# Patient Record
Sex: MALE | Race: WHITE | NOT HISPANIC OR LATINO | Employment: UNEMPLOYED | URBAN - METROPOLITAN AREA
[De-identification: names, ages, dates, MRNs, and addresses within clinical notes are randomized per-mention and may not be internally consistent; named-entity substitution may affect disease eponyms.]

---

## 2021-05-28 ENCOUNTER — RECORDS - HEALTHEAST (OUTPATIENT)
Dept: ADMINISTRATIVE | Facility: CLINIC | Age: 56
End: 2021-05-28

## 2021-08-12 ENCOUNTER — HOSPITAL ENCOUNTER (INPATIENT)
Facility: CLINIC | Age: 56
LOS: 14 days | Discharge: PSYCHIATRIC OR SUBSTANCE USE FACILITY | End: 2021-08-27
Attending: EMERGENCY MEDICINE | Admitting: PSYCHIATRY & NEUROLOGY
Payer: COMMERCIAL

## 2021-08-12 DIAGNOSIS — I25.10 CVD (CARDIOVASCULAR DISEASE): Primary | ICD-10-CM

## 2021-08-12 DIAGNOSIS — F29 PSYCHOSIS, UNSPECIFIED PSYCHOSIS TYPE (H): ICD-10-CM

## 2021-08-12 DIAGNOSIS — Z11.52 ENCOUNTER FOR SCREENING LABORATORY TESTING FOR SEVERE ACUTE RESPIRATORY SYNDROME CORONAVIRUS 2 (SARS-COV-2): ICD-10-CM

## 2021-08-12 DIAGNOSIS — R52 PAIN: ICD-10-CM

## 2021-08-12 DIAGNOSIS — F25.9 SCHIZOAFFECTIVE DISORDER, CHRONIC CONDITION WITH ACUTE EXACERBATION (H): ICD-10-CM

## 2021-08-12 DIAGNOSIS — F22 PARANOID STATE, SIMPLE (H): ICD-10-CM

## 2021-08-12 DIAGNOSIS — F33.3 SEVERE RECURRENT MAJOR DEPRESSION WITH PSYCHOTIC FEATURES (H): ICD-10-CM

## 2021-08-12 DIAGNOSIS — F32.A DEPRESSION, UNSPECIFIED DEPRESSION TYPE: ICD-10-CM

## 2021-08-12 PROCEDURE — 99285 EMERGENCY DEPT VISIT HI MDM: CPT | Mod: 25 | Performed by: EMERGENCY MEDICINE

## 2021-08-12 PROCEDURE — C9803 HOPD COVID-19 SPEC COLLECT: HCPCS | Performed by: EMERGENCY MEDICINE

## 2021-08-12 PROCEDURE — 99284 EMERGENCY DEPT VISIT MOD MDM: CPT | Performed by: EMERGENCY MEDICINE

## 2021-08-12 PROCEDURE — 250N000013 HC RX MED GY IP 250 OP 250 PS 637: Performed by: EMERGENCY MEDICINE

## 2021-08-12 RX ORDER — IBUPROFEN 600 MG/1
600 TABLET, FILM COATED ORAL ONCE
Status: COMPLETED | OUTPATIENT
Start: 2021-08-12 | End: 2021-08-12

## 2021-08-12 RX ADMIN — IBUPROFEN 600 MG: 600 TABLET, FILM COATED ORAL at 22:56

## 2021-08-13 ENCOUNTER — TELEPHONE (OUTPATIENT)
Dept: BEHAVIORAL HEALTH | Facility: CLINIC | Age: 56
End: 2021-08-13

## 2021-08-13 PROBLEM — F29 PSYCHOSIS, UNSPECIFIED PSYCHOSIS TYPE (H): Status: ACTIVE | Noted: 2021-08-13

## 2021-08-13 PROBLEM — F32.A DEPRESSION, UNSPECIFIED DEPRESSION TYPE: Status: ACTIVE | Noted: 2021-08-13

## 2021-08-13 LAB
ALBUMIN SERPL-MCNC: 3.4 G/DL (ref 3.4–5)
ALP SERPL-CCNC: 101 U/L (ref 40–150)
ALT SERPL W P-5'-P-CCNC: 37 U/L (ref 0–70)
AMPHETAMINES UR QL SCN: ABNORMAL
ANION GAP SERPL CALCULATED.3IONS-SCNC: 5 MMOL/L (ref 3–14)
AST SERPL W P-5'-P-CCNC: 27 U/L (ref 0–45)
BARBITURATES UR QL: ABNORMAL
BASOPHILS # BLD AUTO: 0.1 10E3/UL (ref 0–0.2)
BASOPHILS NFR BLD AUTO: 1 %
BENZODIAZ UR QL: ABNORMAL
BILIRUB SERPL-MCNC: 0.7 MG/DL (ref 0.2–1.3)
BUN SERPL-MCNC: 29 MG/DL (ref 7–30)
CALCIUM SERPL-MCNC: 9 MG/DL (ref 8.5–10.1)
CANNABINOIDS UR QL SCN: ABNORMAL
CHLORIDE BLD-SCNC: 102 MMOL/L (ref 94–109)
CO2 SERPL-SCNC: 29 MMOL/L (ref 20–32)
COCAINE UR QL: ABNORMAL
CREAT SERPL-MCNC: 1.14 MG/DL (ref 0.66–1.25)
EOSINOPHIL # BLD AUTO: 0.3 10E3/UL (ref 0–0.7)
EOSINOPHIL NFR BLD AUTO: 4 %
ERYTHROCYTE [DISTWIDTH] IN BLOOD BY AUTOMATED COUNT: 12.8 % (ref 10–15)
GFR SERPL CREATININE-BSD FRML MDRD: 70 ML/MIN/1.73M2
GLUCOSE BLD-MCNC: 191 MG/DL (ref 70–99)
GLUCOSE BLDC GLUCOMTR-MCNC: 182 MG/DL (ref 70–99)
HCT VFR BLD AUTO: 39.8 % (ref 40–53)
HGB BLD-MCNC: 13.4 G/DL (ref 13.3–17.7)
IMM GRANULOCYTES # BLD: 0 10E3/UL
IMM GRANULOCYTES NFR BLD: 0 %
LYMPHOCYTES # BLD AUTO: 1.9 10E3/UL (ref 0.8–5.3)
LYMPHOCYTES NFR BLD AUTO: 27 %
MCH RBC QN AUTO: 28.7 PG (ref 26.5–33)
MCHC RBC AUTO-ENTMCNC: 33.7 G/DL (ref 31.5–36.5)
MCV RBC AUTO: 85 FL (ref 78–100)
MONOCYTES # BLD AUTO: 0.7 10E3/UL (ref 0–1.3)
MONOCYTES NFR BLD AUTO: 10 %
NEUTROPHILS # BLD AUTO: 4 10E3/UL (ref 1.6–8.3)
NEUTROPHILS NFR BLD AUTO: 58 %
NRBC # BLD AUTO: 0 10E3/UL
NRBC BLD AUTO-RTO: 0 /100
OPIATES UR QL SCN: ABNORMAL
PLATELET # BLD AUTO: 118 10E3/UL (ref 150–450)
POTASSIUM BLD-SCNC: 4.3 MMOL/L (ref 3.4–5.3)
PROT SERPL-MCNC: 7.4 G/DL (ref 6.8–8.8)
RBC # BLD AUTO: 4.67 10E6/UL (ref 4.4–5.9)
SARS-COV-2 RNA RESP QL NAA+PROBE: NEGATIVE
SODIUM SERPL-SCNC: 136 MMOL/L (ref 133–144)
WBC # BLD AUTO: 7.1 10E3/UL (ref 4–11)

## 2021-08-13 PROCEDURE — 82040 ASSAY OF SERUM ALBUMIN: CPT | Performed by: EMERGENCY MEDICINE

## 2021-08-13 PROCEDURE — U0003 INFECTIOUS AGENT DETECTION BY NUCLEIC ACID (DNA OR RNA); SEVERE ACUTE RESPIRATORY SYNDROME CORONAVIRUS 2 (SARS-COV-2) (CORONAVIRUS DISEASE [COVID-19]), AMPLIFIED PROBE TECHNIQUE, MAKING USE OF HIGH THROUGHPUT TECHNOLOGIES AS DESCRIBED BY CMS-2020-01-R: HCPCS | Performed by: EMERGENCY MEDICINE

## 2021-08-13 PROCEDURE — 80307 DRUG TEST PRSMV CHEM ANLYZR: CPT | Performed by: EMERGENCY MEDICINE

## 2021-08-13 PROCEDURE — 85025 COMPLETE CBC W/AUTO DIFF WBC: CPT | Performed by: EMERGENCY MEDICINE

## 2021-08-13 PROCEDURE — 124N000003 HC R&B MH SENIOR/ADOLESCENT

## 2021-08-13 PROCEDURE — 99222 1ST HOSP IP/OBS MODERATE 55: CPT | Performed by: PHYSICIAN ASSISTANT

## 2021-08-13 PROCEDURE — 36415 COLL VENOUS BLD VENIPUNCTURE: CPT | Performed by: EMERGENCY MEDICINE

## 2021-08-13 PROCEDURE — 250N000013 HC RX MED GY IP 250 OP 250 PS 637: Performed by: EMERGENCY MEDICINE

## 2021-08-13 PROCEDURE — 90791 PSYCH DIAGNOSTIC EVALUATION: CPT

## 2021-08-13 PROCEDURE — 250N000013 HC RX MED GY IP 250 OP 250 PS 637: Performed by: NURSE PRACTITIONER

## 2021-08-13 PROCEDURE — 99207 PR CONSULT E&M CHANGED TO INITIAL LEVEL: CPT | Performed by: PHYSICIAN ASSISTANT

## 2021-08-13 RX ORDER — ACETAMINOPHEN 500 MG
1000 TABLET ORAL ONCE
Status: COMPLETED | OUTPATIENT
Start: 2021-08-13 | End: 2021-08-13

## 2021-08-13 RX ORDER — ATORVASTATIN CALCIUM 20 MG/1
20 TABLET, FILM COATED ORAL DAILY
Status: DISCONTINUED | OUTPATIENT
Start: 2021-08-13 | End: 2021-08-27 | Stop reason: HOSPADM

## 2021-08-13 RX ORDER — GABAPENTIN 600 MG/1
600 TABLET ORAL 2 TIMES DAILY
Status: DISCONTINUED | OUTPATIENT
Start: 2021-08-13 | End: 2021-08-27 | Stop reason: HOSPADM

## 2021-08-13 RX ORDER — OLANZAPINE 10 MG/2ML
10 INJECTION, POWDER, FOR SOLUTION INTRAMUSCULAR 3 TIMES DAILY PRN
Status: DISCONTINUED | OUTPATIENT
Start: 2021-08-13 | End: 2021-08-27 | Stop reason: HOSPADM

## 2021-08-13 RX ORDER — OLANZAPINE 10 MG/1
10 TABLET ORAL 3 TIMES DAILY PRN
Status: DISCONTINUED | OUTPATIENT
Start: 2021-08-13 | End: 2021-08-27 | Stop reason: HOSPADM

## 2021-08-13 RX ORDER — QUETIAPINE FUMARATE 100 MG/1
100 TABLET, FILM COATED ORAL AT BEDTIME
Status: DISCONTINUED | OUTPATIENT
Start: 2021-08-13 | End: 2021-08-16

## 2021-08-13 RX ORDER — ACETAMINOPHEN 325 MG/1
325-650 TABLET ORAL EVERY 6 HOURS PRN
COMMUNITY

## 2021-08-13 RX ORDER — TRAZODONE HYDROCHLORIDE 50 MG/1
50 TABLET, FILM COATED ORAL
Status: DISCONTINUED | OUTPATIENT
Start: 2021-08-13 | End: 2021-08-27 | Stop reason: HOSPADM

## 2021-08-13 RX ORDER — MAGNESIUM HYDROXIDE/ALUMINUM HYDROXICE/SIMETHICONE 120; 1200; 1200 MG/30ML; MG/30ML; MG/30ML
30 SUSPENSION ORAL EVERY 4 HOURS PRN
Status: DISCONTINUED | OUTPATIENT
Start: 2021-08-13 | End: 2021-08-27 | Stop reason: HOSPADM

## 2021-08-13 RX ORDER — ATORVASTATIN CALCIUM 20 MG/1
20 TABLET, FILM COATED ORAL DAILY
Status: DISCONTINUED | OUTPATIENT
Start: 2021-08-13 | End: 2021-08-13

## 2021-08-13 RX ORDER — GABAPENTIN 300 MG/1
300 CAPSULE ORAL 3 TIMES DAILY
Status: DISCONTINUED | OUTPATIENT
Start: 2021-08-13 | End: 2021-08-13

## 2021-08-13 RX ORDER — QUETIAPINE FUMARATE 100 MG/1
100 TABLET, FILM COATED ORAL AT BEDTIME
Status: DISCONTINUED | OUTPATIENT
Start: 2021-08-13 | End: 2021-08-13

## 2021-08-13 RX ORDER — DULOXETIN HYDROCHLORIDE 30 MG/1
30 CAPSULE, DELAYED RELEASE ORAL 2 TIMES DAILY
Status: DISCONTINUED | OUTPATIENT
Start: 2021-08-13 | End: 2021-08-16

## 2021-08-13 RX ORDER — ATORVASTATIN CALCIUM 20 MG/1
20 TABLET, FILM COATED ORAL DAILY
Status: ON HOLD | COMMUNITY
End: 2021-08-20

## 2021-08-13 RX ORDER — POLYETHYLENE GLYCOL 3350 17 G/17G
17 POWDER, FOR SOLUTION ORAL DAILY PRN
Status: DISCONTINUED | OUTPATIENT
Start: 2021-08-13 | End: 2021-08-27 | Stop reason: HOSPADM

## 2021-08-13 RX ORDER — PHENOL 1.4 %
10 AEROSOL, SPRAY (ML) MUCOUS MEMBRANE
COMMUNITY

## 2021-08-13 RX ORDER — METFORMIN HCL 500 MG
1000 TABLET, EXTENDED RELEASE 24 HR ORAL 2 TIMES DAILY WITH MEALS
Status: DISCONTINUED | OUTPATIENT
Start: 2021-08-13 | End: 2021-08-14

## 2021-08-13 RX ORDER — ASPIRIN 81 MG/1
81 TABLET, CHEWABLE ORAL DAILY
Status: DISCONTINUED | OUTPATIENT
Start: 2021-08-13 | End: 2021-08-27 | Stop reason: HOSPADM

## 2021-08-13 RX ORDER — ACETAMINOPHEN 325 MG/1
325-650 TABLET ORAL EVERY 6 HOURS PRN
Status: DISCONTINUED | OUTPATIENT
Start: 2021-08-13 | End: 2021-08-14

## 2021-08-13 RX ORDER — HYDROXYZINE HYDROCHLORIDE 25 MG/1
25 TABLET, FILM COATED ORAL EVERY 4 HOURS PRN
Status: DISCONTINUED | OUTPATIENT
Start: 2021-08-13 | End: 2021-08-27 | Stop reason: HOSPADM

## 2021-08-13 RX ADMIN — QUETIAPINE FUMARATE 100 MG: 100 TABLET ORAL at 20:28

## 2021-08-13 RX ADMIN — DULOXETINE HYDROCHLORIDE 30 MG: 30 CAPSULE, DELAYED RELEASE ORAL at 09:29

## 2021-08-13 RX ADMIN — DULOXETINE HYDROCHLORIDE 30 MG: 30 CAPSULE, DELAYED RELEASE ORAL at 20:31

## 2021-08-13 RX ADMIN — QUETIAPINE FUMARATE 100 MG: 100 TABLET ORAL at 02:13

## 2021-08-13 RX ADMIN — GABAPENTIN 300 MG: 300 CAPSULE ORAL at 09:29

## 2021-08-13 RX ADMIN — ACETAMINOPHEN 1000 MG: 500 TABLET ORAL at 16:19

## 2021-08-13 RX ADMIN — GABAPENTIN 300 MG: 300 CAPSULE ORAL at 15:25

## 2021-08-13 RX ADMIN — ATORVASTATIN CALCIUM 20 MG: 20 TABLET, FILM COATED ORAL at 09:28

## 2021-08-13 RX ADMIN — GABAPENTIN 600 MG: 600 TABLET, FILM COATED ORAL at 20:31

## 2021-08-13 ASSESSMENT — ACTIVITIES OF DAILY LIVING (ADL)
DRESS: SCRUBS (BEHAVIORAL HEALTH)
ORAL_HYGIENE: INDEPENDENT
HYGIENE/GROOMING: INDEPENDENT

## 2021-08-13 ASSESSMENT — MIFFLIN-ST. JEOR: SCORE: 1820.09

## 2021-08-13 NOTE — ED NOTES
ED to Behavioral Floor Handoff    SITUATION  Hi Do is a 56 year old male who speaks English, currently homeless, and unknown with whom he lives with. The patient arrived in the ED by private car from emergency room with a complaint of Suicidal (suicidal thoughts of jumping off a bridge, reports a  stopped him and brought him in, having mental health trouble and doesn't have resources to commit to following up with outpatient services)  .The patient's current symptoms started/worsened 8 week(s) ago and during this time the symptoms have increased.   In the ED, pt was diagnosed with   Final diagnoses:   Depression, unspecified depression type   Psychosis, unspecified psychosis type (H)      PMH significant for PTSD, MDD with psychotic features, and polysubstance abuse who presents to the Emergency Department for evaluation of suicidal ideation.  Patient was found by the police on a bridge with the intention of jumping off to kill himself.  Patient reports auditory and visual hallucinations that are telling him he is worthless and that he needs to kill himself.  He also indicates he sees very disturbing, graphic images.  Patient has a hip problem from jumping off a third floor balcony 7 weeks ago also in a suicide attempt.  Patient was recently hospitalized and set up with a bed in a [facility] facility, but he did not go to this and went back to his apartment.  He also did not follow-up with the outpatient appointments scheduled for him to establish psychiatrist and therapist.  He then eventually stopped taking his medications and the symptoms got worse.  Patient has a history of substance abuse, but denies recent use.  Patient reports that he was raised in foster care since 9 months old.  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  He was   and had kids.  Patient reports that he has not worked since 2011 and has been on government assistance.  He states that he is currently homeless.        Initial vitals were: BP: 118/79  Pulse: 102  Temp: 99  F (37.2  C)  Resp: 16  Weight: 104.3 kg (230 lb)  SpO2: 98 %   --------  Is the patient diabetic? No   If yes, last blood glucose? --     If yes, was this treated in the ED? --  --------  Is the patient inebriated (ETOH) No or Impaired on other substances? No  MSSA done? No  Last MSSA score: --    Were withdrawal symptoms treated? No  Does the patient have a seizure history? No. If yes, date of most recent seizure--  --------  Is the patient patient experiencing suicidal ideation? has a history of suicidal attempts, most recent last night    Homicidal ideation? denies current or recent homicidal ideation or behaviors.    Self-injurious behavior/urges? denies current or recent self injurious behavior or ideation.  ------  Was pt aggressive in the ED No  Was a code called No  Is the pt now cooperative? Yes  -------  Meds given in ED:   Medications   DULoxetine (CYMBALTA) DR capsule 30 mg (30 mg Oral Given 8/13/21 0929)   gabapentin (NEURONTIN) capsule 300 mg (300 mg Oral Given 8/13/21 1525)   QUEtiapine (SEROquel) tablet 100 mg (100 mg Oral Given 8/13/21 0213)   metFORMIN (GLUCOPHAGE-XR) 24 hr tablet 1,000 mg (1,000 mg Oral Not Given 8/13/21 0921)   atorvastatin (LIPITOR) tablet 20 mg (20 mg Oral Given 8/13/21 0928)   ibuprofen (ADVIL/MOTRIN) tablet 600 mg (600 mg Oral Given 8/12/21 2256)      Family present during ED course? No  Family currently present? No    BACKGROUND  Does the patient have a cognitive impairment or developmental disability? No  Allergies: No Known Allergies.   Social demographics are   Social History     Socioeconomic History     Marital status: Single     Spouse name: None     Number of children: None     Years of education: None     Highest education level: None   Occupational  History     None   Tobacco Use     Smoking status: Current Every Day Smoker     Packs/day: 0.25     Smokeless tobacco: Never Used   Substance and Sexual Activity     Alcohol use: Not Currently     Drug use: Not Currently     Sexual activity: None   Other Topics Concern     None   Social History Narrative     None     Social Determinants of Health     Financial Resource Strain:      Difficulty of Paying Living Expenses:    Food Insecurity:      Worried About Running Out of Food in the Last Year:      Ran Out of Food in the Last Year:    Transportation Needs:      Lack of Transportation (Medical):      Lack of Transportation (Non-Medical):    Physical Activity:      Days of Exercise per Week:      Minutes of Exercise per Session:    Stress:      Feeling of Stress :    Social Connections:      Frequency of Communication with Friends and Family:      Frequency of Social Gatherings with Friends and Family:      Attends Holiness Services:      Active Member of Clubs or Organizations:      Attends Club or Organization Meetings:      Marital Status:    Intimate Partner Violence:      Fear of Current or Ex-Partner:      Emotionally Abused:      Physically Abused:      Sexually Abused:         ASSESSMENT  Labs results   Labs Ordered and Resulted from Time of ED Arrival Up to the Time of Departure from the ED   DRUG ABUSE SCREEN 1 URINE (ED) - Abnormal; Notable for the following components:       Result Value    Cocaine Urine Screen Positive (*)     All other components within normal limits   COMPREHENSIVE METABOLIC PANEL - Abnormal; Notable for the following components:    Glucose 191 (*)     All other components within normal limits   CBC WITH PLATELETS AND DIFFERENTIAL - Abnormal; Notable for the following components:    Hematocrit 39.8 (*)     Platelet Count 118 (*)     All other components within normal limits   GLUCOSE BY METER - Abnormal; Notable for the following components:    GLUCOSE BY METER POCT 182 (*)     All  other components within normal limits   SARS-COV2 (COVID-19) VIRUS RT-PCR - Normal    Narrative:     Testing was performed using the [employer]  SARS-CoV-2 & Influenza A/B Assay on the [employer]  Tressa  System.  This test should be ordered for the detection of SARS-COV-2 in individuals who meet SARS-CoV-2 clinical and/or epidemiological criteria. Test performance is unknown in asymptomatic patients.  This test is for in vitro diagnostic use under the FDA EUA for laboratories certified under CLIA to perform moderate and/or high complexity testing. This test has not been FDA cleared or approved.  A negative test does not rule out the presence of PCR inhibitors in the specimen or target RNA in concentration below the limit of detection for the assay. The possibility of a false negative should be considered if the patient's recent exposure or clinical presentation suggests COVID-19.  [facility] are certified under the Clinical Laboratory Improvement Amendments of 1988 (CLIA-88) as qualified to perform moderate and/or high complexity laboratory testing.   GLUCOSE MONITOR NURSING POCT   CBC WITH PLATELETS & DIFFERENTIAL    Narrative:     The following orders were created for panel order CBC with platelets differential.  Procedure                               Abnormality         Status                     ---------                               -----------         ------                     CBC with platelets and d...[[id number]]  Abnormal            Final result                 Please view results for these tests on the individual orders.   COVID-19 VIRUS (CORONAVIRUS) BY PCR    Narrative:     The following orders were created for panel order Asymptomatic COVID-19 Virus (Coronavirus) by PCR Nasopharyngeal.  Procedure                               Abnormality         Status                     ---------                               -----------         ------                     SARS-COV2 (COVID-19)  Vir...[[id number]]  Normal              Final result                 Please view results for these tests on the individual orders.   URINE DRUGS OF ABUSE SCREEN    Narrative:     The following orders were created for panel order Urine Drugs of Abuse Screen.  Procedure                               Abnormality         Status                     ---------                               -----------         ------                     Drug abuse screen 1 urin...[[id number]]  Abnormal            Final result                 Please view results for these tests on the individual orders.      Imaging Studies: No results found for this or any previous visit (from the past 24 hour(s)).   Most recent vital signs BP (!) 138/100   Pulse 75   Temp 98.2  F (36.8  C) (Oral)   Resp 18   Wt 104.3 kg (230 lb)   SpO2 98%   BMI 33.00 kg/m     Abnormal labs/tests/findings requiring intervention:---   Pain control: fair  Nausea control: pt had none    RECOMMENDATION  Are any infection precautions needed (MRSA, VRE, etc.)? No If yes, what infection? --  ---  Does the patient have mobility issues? independently. If yes, what device does the pt use? ---  ---  Is patient on 72 hour hold or commitment? No If on 72 hour hold, have hold and rights been given to patient? No  Are admitting orders written if after 10 p.m. ?No  Tasks needing to be completed:---     Devorah Sparrow RN     [phone] [facility]
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
## 2021-08-13 NOTE — SAFE
Hi Do  August 13, 2021    Critical Safety Issues: Pt comes to ED by police after they found him on a bridge with plan to jump, reports in mid-July he jumped from a 3rd floor balcony in a suicide attempt injuring his hip, admitted to [facility] when he sought medical attention for pain and reported continued suicidal thoughts to cut his throat.  Pt reports increased depression, hopelessness, worthlessness, auditory and visual hallucinations, referred to IRTS from [facility] but didn't go, did not keep follow up appointments, currently polite and cooperative, voluntary admission.      Current Suicidal Ideation/Self-Injurious Concerns/Methods: Jumping (from building, into traffic, etc.)      Current or Historical Inappropriate Sexual Behavior: No      Current or Historical Aggression/Homicidal Ideation: None - N/A      Triggers: Psychotic symptoms    Updated care team: Yes: ED, Central Intake    For additional details see full LMHP assessment.       Troy Prescott

## 2021-08-13 NOTE — TELEPHONE ENCOUNTER
S: Gabe, [place] ED, 56/M, SI w plan     B: Pt was found on bridge by police, brought into the ED   Pt report SI w plan to cut his throat  Last IP MH in Aug at [facility]   Pt disclosed a SA about 3 weeks ago via jumping off a balcony   AH and VH of bloody and violent images, voices that are negative   Pt reports he has not been taking his meds   Pt reports cocaine use to help the voices     Medically cleared, eating, drinking, ambulating indep  Patient cleared and ready for behavioral bed placement: Yes   No covid concerns, test ordered     A: Voluntary     R: 3B/Ayse/Timothy     Intake asked  to remind ED provider to order lab work and covid   342am - Timothy, on call provider, paged   347am - Timothy accepts   Pt placed in queue   348am - unit charge notified, 8am for report   350am - ED charge notified via text page

## 2021-08-13 NOTE — PROGRESS NOTES
08/13/21 1642   Patient Belongings   Did you bring any home meds/supplements to the hospital?  Yes   Disposition of meds  Sent to security/pharmacy per site process   Patient Belongings locker   Belongings Search Yes   Clothing Search Yes     Locker   -Belt  -two mask  -pill cutter  -1 lighter  -black Flip Flops  -1 pants and 1 T-shirt  A               Admission:  I am responsible for any personal items that are not sent to the safe or pharmacy.  [facility] is not responsible for loss, theft or damage of any property in my possession.    Signature:  _________________________________ Date: _______  Time: _____                                              Staff Signature:  ____________________________ Date: ________  Time: _____      2nd Staff person, if patient is unable/unwilling to sign:    Signature: ________________________________ Date: ________  Time: _____     Discharge:  [facility] has returned all of my personal belongings:    Signature: _________________________________ Date: ________  Time: _____                                          Staff Signature:  ____________________________ Date: ________  Time: _____

## 2021-08-13 NOTE — PHARMACY-ADMISSION MEDICATION HISTORY
Admission medication history interview status for the 8/12/2021 admission is complete. See EPIC admission navigator for allergy information, pharmacy, prior to admission medications and immunization status.     Medication history interview sources:  patient, [place], [place] , chart notes    Changes made to PTA medication list (reason)  Added: APAP, melatonin, Lipitor=all reported by patient. Lipitor supported by fill history.   Deleted:   1. Benztropine=patient reported not taking  2. Duplicate Seroquel  3. Trazodone=patient reports this was stopped when Seroquel was started  Changed:   1. Added doses for duloxetine and gabapentin  2. Changed Seroquel dose from 50mg to 100mg by mouth at bedtime    Additional medication history information (including reliability of information, actions taken by pharmacist):  1. Pharmacy of choice: [facility]; [place] [facility]  2. Metformin ER 500mg and linagliptan 50mg (both used daily) filled regularly for the past few months but patient reports not taking these per MD order.  Patient has lost weight and reports MD discontinued DM meds because they are not needed.  3.  Per [place] , gabapentin 600mg tabs last filled 8/11, #21; 14 day supply    Medication history completed by: Jerrica Matson, JazmínD      Prior to Admission medications    Medication Sig Last Dose Taking? Auth Provider   atorvastatin (LIPITOR) 20 MG tablet Take 20 mg by mouth daily 8/11/2021 Yes Unknown, Entered By History   DULOXETINE HCL PO Take 30 mg by mouth daily  Past Month at Unknown time Yes Reported, Patient   GABAPENTIN PO Take 600 mg by mouth 2 times daily  8/11/2021 Yes Reported, Patient   Melatonin 10 MG TABS tablet Take 10 mg by mouth nightly as needed for sleep Past Week at Unknown time Yes Unknown, Entered By History   QUEtiapine (SEROQUEL) 50 MG tablet Take 100 mg by mouth At Bedtime  8/11/2021 Yes Provider, Historical   acetaminophen (TYLENOL) 325 MG tablet Take 325-650 mg by mouth every 6  hours as needed for mild pain More than a month at Unknown time  Unknown, Entered By History   aspirin 81 mg chewable tablet [ASPIRIN 81 MG CHEWABLE TABLET] Chew 81 mg daily. 8/11/2021  Provider, Historical
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
## 2021-08-13 NOTE — ED NOTES
Bed: ED11  Expected date: 8/13/21  Expected time: 3:25 PM  Means of arrival: Ambulance  [place]  [phone] [place]

## 2021-08-13 NOTE — ED PROVIDER NOTES
[facility] EMERGENCY DEPARTMENT ([facility])   August 13, 2021  History     Chief Complaint   Patient presents with     Suicidal     suicidal thoughts of jumping off a bridge, reports a  stopped him and brought him in, having mental health trouble and doesn't have resources to commit to following up with outpatient services     The history is provided by the patient and medical records.     Hi Do is a 56 year old male with PMH significant for PTSD, MDD with psychotic features, and polysubstance abuse who presents to the Emergency Department for evaluation of suicidal ideation.  Patient was found by the police on a bridge with the intention of jumping off to kill himself.  Patient reports auditory and visual hallucinations that are telling him he is worthless and that he needs to kill himself.  He also indicates he sees very disturbing, graphic images.  Patient has a hip problem from jumping off a third floor balcony 7 weeks ago also in a suicide attempt.  Patient was recently hospitalized and [facility], but he did not go to this and went back to his apartment.  He also did not follow-up with the outpatient appointments scheduled for him to establish psychiatrist and therapist.  He then eventually stopped taking his medications and the symptoms got worse.  Patient has a history of substance abuse, but denies recent use.  Patient reports that he was raised in foster care since 9 months old.  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  He was  and had kids.  Patient reports that he has not worked since 2011 and has been on government assistance.  He states that he is currently homeless.      PAST MEDICAL HISTORY:   Past Medical History:   Diagnosis Date     PTSD (post-traumatic stress disorder)       Schizophrenia (H)        PAST SURGICAL HISTORY: History reviewed. No pertinent surgical history.    Past medical history, past surgical history, medications, and allergies were reviewed with the patient. Additional pertinent items: None    FAMILY HISTORY: History reviewed. No pertinent family history.    SOCIAL HISTORY:   Social History     Tobacco Use     Smoking status: Current Every Day Smoker     Packs/day: 0.25     Smokeless tobacco: Never Used   Substance Use Topics     Alcohol use: Not Currently     Social history was reviewed with the patient. Additional pertinent items: None      Patient's Medications   New Prescriptions    No medications on file   Previous Medications    ASPIRIN 81 MG CHEWABLE TABLET    [ASPIRIN 81 MG CHEWABLE TABLET] Chew 81 mg daily.    BENZTROPINE (COGENTIN) 1 MG TABLET    [BENZTROPINE (COGENTIN) 1 MG TABLET] Take 1 tablet (1 mg total) by mouth every morning.    DULOXETINE HCL PO        GABAPENTIN PO        QUETIAPINE (SEROQUEL) 50 MG TABLET    [QUETIAPINE (SEROQUEL) 50 MG TABLET] Take 50 mg by mouth bedtime.    QUETIAPINE (SEROQUEL) 50 MG TABLET    [QUETIAPINE (SEROQUEL) 50 MG TABLET] Take 50 mg by mouth daily as needed.    TRAZODONE HCL PO       Modified Medications    No medications on file   Discontinued Medications    No medications on file        No Known Allergies     Review of Systems  A complete review of systems was performed with pertinent positives and negatives noted in the HPI, and all other systems negative.    Physical Exam   BP: 118/79  Pulse: 102  Temp: 99  F (37.2  C)  Resp: 16  Weight: 104.3 kg (230 lb)  SpO2: 98 %      Physical Exam  Constitutional:       General: He is not in acute distress.     Appearance: He is not diaphoretic.   HENT:      Head: Atraumatic.   Eyes:      General: No scleral icterus.  Cardiovascular:      Heart sounds: Normal heart sounds.   Pulmonary:      Effort: No respiratory distress.      Breath sounds: Normal breath sounds.   Abdominal:       Palpations: Abdomen is soft.      Tenderness: There is no abdominal tenderness.   Musculoskeletal:         General: No deformity.   Skin:     General: Skin is warm.         ED Course        Procedures              Results for orders placed or performed during the hospital encounter of 08/12/21 (from the past 24 hour(s))   Urine Drugs of Abuse Screen    Narrative    The following orders were created for panel order Urine Drugs of Abuse Screen.  Procedure                               Abnormality         Status                     ---------                               -----------         ------                     Drug abuse screen 1 urin...[[id number]]  Abnormal            Final result                 Please view results for these tests on the individual orders.   Drug abuse screen 1 urine (ED)   Result Value Ref Range    Amphetamines Urine Screen Negative Screen Negative    Barbiturates Urine Screen Negative Screen Negative    Benzodiazepines Urine Screen Negative Screen Negative    Cannabinoids Urine Screen Negative Screen Negative    Cocaine Urine Screen Positive (A) Screen Negative    Opiates Urine Screen Negative Screen Negative   CBC with platelets differential    Narrative    The following orders were created for panel order CBC with platelets differential.  Procedure                               Abnormality         Status                     ---------                               -----------         ------                     CBC with platelets and d...[[id number]]  Abnormal            Final result                 Please view results for these tests on the individual orders.   Comprehensive metabolic panel   Result Value Ref Range    Sodium 136 133 - 144 mmol/L    Potassium 4.3 3.4 - 5.3 mmol/L    Chloride 102 94 - 109 mmol/L    Carbon Dioxide (CO2) 29 20 - 32 mmol/L    Anion Gap 5 3 - 14 mmol/L    Urea Nitrogen 29 7 - 30 mg/dL    Creatinine 1.14 0.66 - 1.25 mg/dL    Calcium 9.0 8.5 - 10.1 mg/dL     Glucose 191 (H) 70 - 99 mg/dL    Alkaline Phosphatase 101 40 - 150 U/L    AST 27 0 - 45 U/L    ALT 37 0 - 70 U/L    Protein Total 7.4 6.8 - 8.8 g/dL    Albumin 3.4 3.4 - 5.0 g/dL    Bilirubin Total 0.7 0.2 - 1.3 mg/dL    GFR Estimate 70 >60 mL/min/1.73m2   CBC with platelets and differential   Result Value Ref Range    WBC Count 7.1 4.0 - 11.0 10e3/uL    RBC Count 4.67 4.40 - 5.90 10e6/uL    Hemoglobin 13.4 13.3 - 17.7 g/dL    Hematocrit 39.8 (L) 40.0 - 53.0 %    MCV 85 78 - 100 fL    MCH 28.7 26.5 - 33.0 pg    MCHC 33.7 31.5 - 36.5 g/dL    RDW 12.8 10.0 - 15.0 %    Platelet Count 118 (L) 150 - 450 10e3/uL    % Neutrophils 58 %    % Lymphocytes 27 %    % Monocytes 10 %    % Eosinophils 4 %    % Basophils 1 %    % Immature Granulocytes 0 %    NRBCs per 100 WBC 0 <1 /100    Absolute Neutrophils 4.0 1.6 - 8.3 10e3/uL    Absolute Lymphocytes 1.9 0.8 - 5.3 10e3/uL    Absolute Monocytes 0.7 0.0 - 1.3 10e3/uL    Absolute Eosinophils 0.3 0.0 - 0.7 10e3/uL    Absolute Basophils 0.1 0.0 - 0.2 10e3/uL    Absolute Immature Granulocytes 0.0 <=0.0 10e3/uL    Absolute NRBCs 0.0 10e3/uL   Asymptomatic COVID-19 Virus (Coronavirus) by PCR Nasopharyngeal    Specimen: Nasopharyngeal; Swab    Narrative    The following orders were created for panel order Asymptomatic COVID-19 Virus (Coronavirus) by PCR Nasopharyngeal.  Procedure                               Abnormality         Status                     ---------                               -----------         ------                     SARS-COV2 (COVID-19) Vir...[[id number]]  Normal              Final result                 Please view results for these tests on the individual orders.   SARS-COV2 (COVID-19) Virus RT-PCR    Specimen: Nasopharyngeal; Swab   Result Value Ref Range    SARS CoV2 PCR Negative Negative    Narrative    Testing was performed using the [employer]ar  SARS-CoV-2 & Influenza A/B Assay on the eliazar  Tressa  System.  This test should be ordered for the detection of  SARS-COV-2 in individuals who meet SARS-CoV-2 clinical and/or epidemiological criteria. Test performance is unknown in asymptomatic patients.  This test is for in vitro diagnostic use under the FDA EUA for laboratories certified under CLIA to perform moderate and/or high complexity testing. This test has not been FDA cleared or approved.  A negative test does not rule out the presence of PCR inhibitors in the specimen or target RNA in concentration below the limit of detection for the assay. The possibility of a false negative should be considered if the patient's recent exposure or clinical presentation suggests COVID-19.  [facility] are certified under the Clinical Laboratory Improvement Amendments of 1988 (CLIA-88) as qualified to perform moderate and/or high complexity laboratory testing.   Glucose by meter   Result Value Ref Range    GLUCOSE BY METER POCT 182 (H) 70 - 99 mg/dL     Medications   DULoxetine (CYMBALTA) DR capsule 30 mg (has no administration in time range)   gabapentin (NEURONTIN) capsule 300 mg (has no administration in time range)   QUEtiapine (SEROquel) tablet 100 mg (100 mg Oral Given 8/13/21 0213)   metFORMIN (GLUCOPHAGE-XR) 24 hr tablet 1,000 mg (has no administration in time range)   atorvastatin (LIPITOR) tablet 20 mg (has no administration in time range)   ibuprofen (ADVIL/MOTRIN) tablet 600 mg (600 mg Oral Given 8/12/21 2256)             Assessments & Plan (with Medical Decision Making)   The patient appears medically stable.  He does voluntarily accept inpatient psychiatric hospitalization for acute stabilization and treatment.    Dictation Disclaimer: Some of this Note has been completed with voice-recognition dictation software. Although errors are generally corrected real-time, there is the potential for a rare error to be present in the completed chart.      I have reviewed the nursing notes.    I have reviewed the findings, diagnosis, plan and need for follow up  with the patient.    New Prescriptions    No medications on file       Final diagnoses:   Depression, unspecified depression type   Psychosis, unspecified psychosis type (H)     IRamiro, am serving as a trained medical scribe to document services personally performed by Marianna Hendricks MD, based on the provider's statements to me.     Marianna PETTY MD, was physically present and have reviewed and verified the accuracy of this note documented by Ramiro Childress.    Marianna Hendricks MD    8/12/2021   [facility] EMERGENCY DEPARTMENT     Marianna Hendricks MD  08/13/21 0626

## 2021-08-14 ENCOUNTER — APPOINTMENT (OUTPATIENT)
Dept: GENERAL RADIOLOGY | Facility: CLINIC | Age: 56
End: 2021-08-14
Attending: PHYSICIAN ASSISTANT
Payer: COMMERCIAL

## 2021-08-14 PROCEDURE — 250N000013 HC RX MED GY IP 250 OP 250 PS 637: Performed by: EMERGENCY MEDICINE

## 2021-08-14 PROCEDURE — 73502 X-RAY EXAM HIP UNI 2-3 VIEWS: CPT

## 2021-08-14 PROCEDURE — 250N000013 HC RX MED GY IP 250 OP 250 PS 637: Performed by: PHYSICIAN ASSISTANT

## 2021-08-14 PROCEDURE — 124N000003 HC R&B MH SENIOR/ADOLESCENT

## 2021-08-14 PROCEDURE — 99223 1ST HOSP IP/OBS HIGH 75: CPT | Mod: AI | Performed by: PSYCHIATRY & NEUROLOGY

## 2021-08-14 PROCEDURE — 250N000013 HC RX MED GY IP 250 OP 250 PS 637: Performed by: NURSE PRACTITIONER

## 2021-08-14 RX ORDER — IBUPROFEN 200 MG
600 TABLET ORAL EVERY 6 HOURS PRN
Status: DISCONTINUED | OUTPATIENT
Start: 2021-08-14 | End: 2021-08-27 | Stop reason: HOSPADM

## 2021-08-14 RX ORDER — LIDOCAINE 4 G/G
1-3 PATCH TOPICAL
Status: DISCONTINUED | OUTPATIENT
Start: 2021-08-15 | End: 2021-08-27 | Stop reason: HOSPADM

## 2021-08-14 RX ORDER — ACETAMINOPHEN 325 MG/1
975 TABLET ORAL EVERY 8 HOURS PRN
Status: DISCONTINUED | OUTPATIENT
Start: 2021-08-14 | End: 2021-08-27 | Stop reason: HOSPADM

## 2021-08-14 RX ADMIN — OLANZAPINE 10 MG: 10 TABLET ORAL at 02:09

## 2021-08-14 RX ADMIN — IBUPROFEN 600 MG: 200 TABLET, FILM COATED ORAL at 19:11

## 2021-08-14 RX ADMIN — DULOXETINE HYDROCHLORIDE 30 MG: 30 CAPSULE, DELAYED RELEASE ORAL at 21:57

## 2021-08-14 RX ADMIN — ASPIRIN 81 MG CHEWABLE TABLET 81 MG: 81 TABLET CHEWABLE at 08:27

## 2021-08-14 RX ADMIN — ACETAMINOPHEN 975 MG: 325 TABLET, FILM COATED ORAL at 21:57

## 2021-08-14 RX ADMIN — GABAPENTIN 600 MG: 600 TABLET, FILM COATED ORAL at 08:26

## 2021-08-14 RX ADMIN — GABAPENTIN 600 MG: 600 TABLET, FILM COATED ORAL at 21:56

## 2021-08-14 RX ADMIN — DULOXETINE HYDROCHLORIDE 30 MG: 30 CAPSULE, DELAYED RELEASE ORAL at 08:27

## 2021-08-14 RX ADMIN — ATORVASTATIN CALCIUM 20 MG: 20 TABLET, FILM COATED ORAL at 08:26

## 2021-08-14 RX ADMIN — TRAZODONE HYDROCHLORIDE 50 MG: 50 TABLET ORAL at 02:09

## 2021-08-14 RX ADMIN — IBUPROFEN 600 MG: 200 TABLET, FILM COATED ORAL at 13:05

## 2021-08-14 RX ADMIN — QUETIAPINE FUMARATE 100 MG: 100 TABLET ORAL at 21:57

## 2021-08-14 ASSESSMENT — ACTIVITIES OF DAILY LIVING (ADL)
HYGIENE/GROOMING: INDEPENDENT
DRESS: SCRUBS (BEHAVIORAL HEALTH);INDEPENDENT
ORAL_HYGIENE: INDEPENDENT
DRESS: SCRUBS (BEHAVIORAL HEALTH)
HYGIENE/GROOMING: INDEPENDENT
ORAL_HYGIENE: INDEPENDENT
LAUNDRY: UNABLE TO COMPLETE

## 2021-08-14 NOTE — PLAN OF CARE
"  Problem: Depression  Goal: Improved Mood  Outcome: Improving     Problem: Anxiety  Goal: Anxiety Reduction or Resolution  Outcome: Improving     Problem: Suicidal Behavior  Goal: Suicidal Behavior is Absent or Managed  Outcome: Improving  Pt. has full range affect, mood is calm, isolative to his room, came out to the lounge for meal. Denies, anxiety, depression and any form of hallucination during the shift. Pt. denies SI, reported that \"I am sleeping so I am okay\" Pt complain right hip pain, PRN Ibuprofeen given and hot pack provided. Pt. noted having unsteady gait, denies dizziness and light headedness, walker provided. Pt. refused lab, BG check and metformin, he stated he stopped taking metformin and his BG checked once a month in his doctor office, MD notified. Patient care order placed to encourage BG checks and notify medicine if persistently >180. Pt. went to radiology departement around 0130 for hip X-Ray. Appetite and sleep is good. Continue to monitor pt. as POC.  "
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
## 2021-08-14 NOTE — PROGRESS NOTES
"CLINICAL NUTRITION SERVICES - BRIEF NOTE     Nutrition Prescription    RECOMMENDATIONS FOR MDs/PROVIDERS TO ORDER:  None today    Malnutrition Status:    Not assessed    Recommendations already ordered by Registered Dietitian (RD):  None today    Future/Additional Recommendations:  RD to sign off, available by request/consult      REASON FOR ASSESSMENT  Hi Do is a 56 year old male assessed by the dietitian for MST score of 4: positive  for weight loss of 34 lbs + and negative  for poor oral intake related to decreased appetite    PMH significant for PTSD, MDD with psychotic features, and polysubstance abuse admitted for SI  Findings  Pt reports a UBW of 293#. Stated he purposefully lost weight to get off metformin. He has increased his physical activity 9swimming, basketball, baseball, running) and stopped eating \"crap\" foods.   12# (5.1%) wt loss in 1 month,   Wt Readings from Last 10 Encounters:   08/13/21 101 kg (222 lb 9.6 oz)   07/07/21 106.1 kg (234 lb)   04/11/20 105.2 kg (232 lb)   10/17/16 111.1 kg (245 lb)     INTERVENTIONS  Implementation  Nutrition Education: Patient declined      Follow up/Monitoring  No nutrition follow-up warranted at this time. RD to sign off. Please consult if further needs arise    Marilin Lloyd MS, RD, LDN  Unit Pager [phone]    "
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
## 2021-08-14 NOTE — PLAN OF CARE
Problem: Suicidal Behavior  Goal: Suicidal Behavior is Absent or Managed  Outcome: No Change     Patient complained of auditory hallucinations that's why he got up and walked to the lounge. Patient stated that the voices are telling him to kill himself. Zyprexa 10 mgs given as prn for symptoms of psychosis/ hallucinations. Trazodone 50 mgs given as prn for sleep. He slept for a total of 7 hours the whole night.

## 2021-08-14 NOTE — PROGRESS NOTES
[facility] If he would like to be visited today by on call staff or wait Soren till Monday as pt. was seen by soren before. Pt. wants to wait till Monday.

## 2021-08-14 NOTE — CONSULTS
[facility]  Consult Note - Hospitalist Service     Date of Admission:  8/12/2021  Consult Requested by: Emely Aguirre APRN CNP  Reason for Consult: Thrombocytopenia, DM2    Assessment & Plan   Hi Do is a 56 year old male with a past medical history of PTSD, polysubstance use, MDD with pscyhotic features admitted on 8/12/2021 for decompensated psychiatric illness.     #MDD with psychotic features  #Suicidal ideation  #PTSD  Presented with plan to jump off bridge. Had recent attempt where jumped off [address] story building and was evaluated in ED. CT C/A/P, cervical spine and head were negative for acute pathology. PTA maintained on Seroquel.   -Management per psychiatry    #Right hip pain: S/P traumatic jump per patient report ~6 weeks ago. XR at [facility] 07/31/2021 w/ no acute osseous injury to pelvis or hips, no sign of dislocation, stable moderate right and mild left hip primary OA. CT pelvis 07/07/2021 w/ degenerative changes in spine and hips, no acute fracture. Pain w/ ambulation and turning in bed.  -Repeat hip XR  -Consider CT hip pending XR  -Tylenol and ibuprofen PRN  -Lidoderm patches    #Polysubstance use disorder: Reports methamphetamine, cocaine, EtOH use. Utox positive for cocaine  -Management per psychiatry    #DM2: Appears poorly controlled. Hgb A1c from 8/4/21 at 10.1%. PTA prescribed metformin & linagliptan. Notes not taking. Discharged from [facility] a couple of weeks ago w/ recs to take metformin 1000 mg BID. Currently refusing BG checks.   -Metformin 1000 mg BID  -BG checks BID, please encourage, but can't force at this point  -Hypoglycemia protocol   -Continue ASA 81 mg daily, atorvastatin 20 mg daily    #Thrombocytopenia: Plts 118 on admission. Denies s/s of bleeding. Previously 324 on 7/7/21.   -Repeat in AM, if stable can f/u as OP w/ in 2-4 weeks of discharge for repeat and consideration of further w/u if still low    #HLD:  Continue statin.     #Mild intermittent asthma: Not on meds PTA. Denies acute issues.     #Tobacco use: Recommend cessation.      The patient's care was discussed with the Bedside Nurse, Patient and Primary team via this note.    Angela Shaver PA-C   [facility]  Securely message with the Vocera Web Console (learn more here)  Text page via "[employer] Paging/Directory      Clinically Significant Risk Factors Present on Admission              # Platelet Defect: home medication list includes an antiplatelet medication      ______________________________________________________________________    Chief Complaint   Depression    History is obtained from the patient    History of Present Illness   Hi Do is a 56 year old male with a past medical history of PTSD, polysubstance use, MDD with pscyhotic features admitted on 8/12/2021 for decompensated psychiatric illness.      The patient notes medically he is most worried about some right hip pain he has been having. He notes pain in his right lateral hip, radiates to but and around front right thigh. No numbness/tingling/weakness. Notes pain is worse with ambulation and turning in bed. Notes he was told it was a muscle strain/sprain, has had imaging at [facility]. He notes no urinary issues. No abdominal pain, nausea/vomiting/diarrhea. Notes no chest pain, dyspnea, cough. He denies any another concerns.     Review of Systems   The 10 point Review of Systems is negative other than noted in the HPI or here.     Past Medical History    I have reviewed this patient's medical history and updated it with pertinent information if needed.   Past Medical History:   Diagnosis Date     PTSD (post-traumatic stress disorder)      Schizophrenia (H)        Past Surgical History   I have reviewed this patient's surgical history and updated it with pertinent information if needed.  History reviewed. No pertinent surgical  history.    Social History   I have reviewed this patient's social history and updated it with pertinent information if needed.  Social History     Tobacco Use     Smoking status: Current Every Day Smoker     Packs/day: 0.25     Smokeless tobacco: Never Used   Substance Use Topics     Alcohol use: Not Currently     Drug use: Not Currently       Family History     Reviewed and updated in epic as appropriate.     Medications   I have reviewed this patient's current medications    Allergies   No Known Allergies    Physical Exam   Vital Signs: Temp: (P) 97.9  F (36.6  C) Temp src: Oral BP: (!) 135/93 Pulse: 66   Resp: 16 SpO2: (P) 98 % O2 Device: (P) None (Room air)    Weight: 230 lbs 0 oz  GENERAL: Alert and oriented x 3. Lying comfortably in bed.   HEENT: Anicteric sclera. Mucous membranes moist and without lesions.   CV: RRR. S1, S2. No murmurs appreciated.   RESPIRATORY: Effort normal on RA. Lungs CTAB with no wheezing, rales, rhonchi.   GI: Abdomen soft and non distended, bowel sounds present. No tenderness, rebound, guarding.   MUSCULOSKELETAL: No joint swelling or tenderness. Moves all extremities.   NEUROLOGICAL: No focal deficits. Strength 5/5 bilaterally in upper and lower extremities. Unable to test hip flexion on right due to pain in hip.   EXTREMITIES: No peripheral edema. Intact bilateral pedal pulses. Sensation intact.   SKIN: No jaundice. No rashes.       Data   Reviewed CMP, CBC, Utox

## 2021-08-15 PROCEDURE — 250N000013 HC RX MED GY IP 250 OP 250 PS 637: Performed by: PHYSICIAN ASSISTANT

## 2021-08-15 PROCEDURE — 250N000013 HC RX MED GY IP 250 OP 250 PS 637: Performed by: EMERGENCY MEDICINE

## 2021-08-15 PROCEDURE — 124N000003 HC R&B MH SENIOR/ADOLESCENT

## 2021-08-15 PROCEDURE — 250N000013 HC RX MED GY IP 250 OP 250 PS 637: Performed by: NURSE PRACTITIONER

## 2021-08-15 RX ADMIN — GABAPENTIN 600 MG: 600 TABLET, FILM COATED ORAL at 21:42

## 2021-08-15 RX ADMIN — IBUPROFEN 600 MG: 200 TABLET, FILM COATED ORAL at 21:43

## 2021-08-15 RX ADMIN — DULOXETINE HYDROCHLORIDE 30 MG: 30 CAPSULE, DELAYED RELEASE ORAL at 08:49

## 2021-08-15 RX ADMIN — GABAPENTIN 600 MG: 600 TABLET, FILM COATED ORAL at 08:49

## 2021-08-15 RX ADMIN — QUETIAPINE FUMARATE 100 MG: 100 TABLET ORAL at 21:42

## 2021-08-15 RX ADMIN — IBUPROFEN 600 MG: 200 TABLET, FILM COATED ORAL at 00:34

## 2021-08-15 RX ADMIN — ASPIRIN 81 MG CHEWABLE TABLET 81 MG: 81 TABLET CHEWABLE at 08:49

## 2021-08-15 RX ADMIN — TRAZODONE HYDROCHLORIDE 50 MG: 50 TABLET ORAL at 21:42

## 2021-08-15 RX ADMIN — DULOXETINE HYDROCHLORIDE 30 MG: 30 CAPSULE, DELAYED RELEASE ORAL at 21:42

## 2021-08-15 RX ADMIN — IBUPROFEN 600 MG: 200 TABLET, FILM COATED ORAL at 08:49

## 2021-08-15 RX ADMIN — ATORVASTATIN CALCIUM 20 MG: 20 TABLET, FILM COATED ORAL at 08:49

## 2021-08-15 ASSESSMENT — ACTIVITIES OF DAILY LIVING (ADL)
HYGIENE/GROOMING: INDEPENDENT
ORAL_HYGIENE: INDEPENDENT
HYGIENE/GROOMING: INDEPENDENT
LAUNDRY: UNABLE TO COMPLETE
ORAL_HYGIENE: INDEPENDENT
DRESS: SCRUBS (BEHAVIORAL HEALTH)
DRESS: INDEPENDENT

## 2021-08-15 ASSESSMENT — MIFFLIN-ST. JEOR: SCORE: 1847.76

## 2021-08-15 NOTE — PLAN OF CARE
Calm, quiet, pleasant, cooperative.  Admits to fleeting thoughts of SI and also fleeting voices that are derogatory in nature telling him how worthless he is and that he should kill himself.rates depression and anxiety at 8/10 and he wishes he were dead. States that he feels safe here.  Declines having his blood sugars done.  Visible in the lounge watching TV.  P:  continue same plan of care.

## 2021-08-15 NOTE — PLAN OF CARE
"Pt is calm and cooperative.  Pt has flat affect, somewhat bright on approach.  Pt reports having anxiety when he thinks about his children and wanting to have a better relationship with them.  Pt states depression is \"not as bad.\"  Pt denies any SI/SIB.  Visible in the milieu.  Did not attend groups.  Appetite is good.      Continues to refuse blood sugar checks.     Declines to use walker when ambulating.  Ambulation is slow and balanced.  Pt encouraged to use hallway handrail for assist.    PRN Ibuprofen 600 mg at 1911 for R hip pain and headache - pt reports as helpful  PRN Tylenol 975 mg at 2157 for R hip pain  "

## 2021-08-16 PROCEDURE — 250N000013 HC RX MED GY IP 250 OP 250 PS 637: Performed by: PHYSICIAN ASSISTANT

## 2021-08-16 PROCEDURE — 250N000013 HC RX MED GY IP 250 OP 250 PS 637: Performed by: EMERGENCY MEDICINE

## 2021-08-16 PROCEDURE — 99233 SBSQ HOSP IP/OBS HIGH 50: CPT | Performed by: NURSE PRACTITIONER

## 2021-08-16 PROCEDURE — 124N000003 HC R&B MH SENIOR/ADOLESCENT

## 2021-08-16 PROCEDURE — 250N000013 HC RX MED GY IP 250 OP 250 PS 637: Performed by: NURSE PRACTITIONER

## 2021-08-16 RX ORDER — DULOXETIN HYDROCHLORIDE 60 MG/1
60 CAPSULE, DELAYED RELEASE ORAL EVERY MORNING
Status: DISCONTINUED | OUTPATIENT
Start: 2021-08-17 | End: 2021-08-27 | Stop reason: HOSPADM

## 2021-08-16 RX ORDER — QUETIAPINE FUMARATE 25 MG/1
25 TABLET, FILM COATED ORAL 2 TIMES DAILY
Status: DISCONTINUED | OUTPATIENT
Start: 2021-08-16 | End: 2021-08-27 | Stop reason: HOSPADM

## 2021-08-16 RX ORDER — QUETIAPINE FUMARATE 200 MG/1
200 TABLET, FILM COATED ORAL AT BEDTIME
Status: DISCONTINUED | OUTPATIENT
Start: 2021-08-16 | End: 2021-08-27 | Stop reason: HOSPADM

## 2021-08-16 RX ADMIN — QUETIAPINE FUMARATE 25 MG: 25 TABLET ORAL at 14:27

## 2021-08-16 RX ADMIN — ATORVASTATIN CALCIUM 20 MG: 20 TABLET, FILM COATED ORAL at 08:29

## 2021-08-16 RX ADMIN — QUETIAPINE 200 MG: 200 TABLET, FILM COATED ORAL at 21:30

## 2021-08-16 RX ADMIN — ASPIRIN 81 MG CHEWABLE TABLET 81 MG: 81 TABLET CHEWABLE at 08:29

## 2021-08-16 RX ADMIN — IBUPROFEN 600 MG: 200 TABLET, FILM COATED ORAL at 06:28

## 2021-08-16 RX ADMIN — GABAPENTIN 600 MG: 600 TABLET, FILM COATED ORAL at 08:29

## 2021-08-16 RX ADMIN — GABAPENTIN 600 MG: 600 TABLET, FILM COATED ORAL at 21:30

## 2021-08-16 RX ADMIN — DULOXETINE HYDROCHLORIDE 30 MG: 30 CAPSULE, DELAYED RELEASE ORAL at 08:29

## 2021-08-16 RX ADMIN — IBUPROFEN 600 MG: 200 TABLET, FILM COATED ORAL at 18:24

## 2021-08-16 ASSESSMENT — ACTIVITIES OF DAILY LIVING (ADL)
LAUNDRY: WITH SUPERVISION
HYGIENE/GROOMING: INDEPENDENT
HYGIENE/GROOMING: INDEPENDENT
ORAL_HYGIENE: INDEPENDENT
DRESS: INDEPENDENT
DRESS: INDEPENDENT;SCRUBS (BEHAVIORAL HEALTH)

## 2021-08-16 NOTE — PLAN OF CARE
"  Problem: Sleep Disturbance  Goal: Adequate Sleep/Rest  Outcome: Improving     Patient woke up past 5:00 a.m. and said he slept good last night. He further stated that the \"Seroquel helped a lot\". Patient slept for 5 hours only the whole night.    Patient complained of headache. Ibuprofen 600 mgs given as prn for pain.  "

## 2021-08-16 NOTE — PLAN OF CARE
BEHAVIORAL TEAM DISCUSSION    Participants: PAYAL Moses, Fatmata Montenegro RN, Rere Guidry, [facility], CTC, Vianney Doty OT  Progress: New admit  Anticipated length of stay: 5-7 days  Continued Stay Criteria/Rationale: SI  Medical/Physical: PTSD, MDD with psychotic features, and polysubstance abuse   Precautions:   Behavioral Orders   Procedures     Code 2     Discontinue 1:1 attendant for suicide risk     Order Specific Question:   I have performed an in person assessment of the patient     Answer:   Based on this assessment the patient no longer requires a one on one attendant at this point in time.     Order Specific Question:   Rationale     Answer:   Patient States able to remain safe in hospital     Fall precautions     Routine Programming     As clinically indicated     Status 15     Every 15 minutes.     Plan: Patient will have psychiatric assessment and medication management by the psychiatrist. Medications will be reviewed and adjusted per MD as indicated. The treatment team will continue to assess and stabilize the patient's mental health symptoms with the use of medications and therapeutic programming. Hospital staff will provide a safe environment and a therapeutic milieu. Staff will continue to assess patient as needed. Patient will participate in unit groups and activities. Patient will receive individual and group support on the unit.      CTC will do individual inpatient treatment planning and after care planning. CTC will discuss options for increasing community supports with the patient. CTC will coordinate with outpatient providers and will place referrals to ensure appropriate follow up care is in place.    Rationale for change in precautions or plan: initial plan.

## 2021-08-16 NOTE — PROVIDER NOTIFICATION
08/16/21 1000   OTHER   Covid Vaccine Screen Patient declines Covid Vaccine   Pt declines vaccine at this time. Pt was encouraged to let staff know if he would change his mind.

## 2021-08-16 NOTE — PLAN OF CARE
Problem: General Plan of Care (Inpatient Behavioral)  Goal: Individualization/Patient Specific Goal (IP Behavioral)  Description: The patient and/or their representative will achieve their patient-specific goals related to the plan of care.    The patient-specific goals include:  Outcome: No Change  Flowsheets (Taken 8/16/2021 1440)  Patient Strengths:   Motivated and ready for change   Optimistic that change can occur    Reason for hospitalization:   Increased depression and Anxiety  Command Hallucinations  SI- with a plan     Goals for discharge:   Stabilization   Medication management  Find an IRTS placement

## 2021-08-16 NOTE — PLAN OF CARE
"  Problem: Suicidal Behavior  Goal: Suicidal Behavior is Absent or Managed  Outcome: No Change  Flowsheets (Taken 8/16/2021 4067)  Mutually Determined Action Steps (Suicidal Behavior Absent/Managed): shares suicidal thoughts     Problem: Depression  Goal: Improved Mood  Outcome: No Change     Problem: Anxiety  Goal: Anxiety Reduction or Resolution  Outcome: No Change     Problem: Sleep Disturbance  Goal: Adequate Sleep/Rest  Outcome: No Change   Pt rated depression at 8/10 and anxiety at 10/10 this AM (where 10 is the worst). Pt reports that he wishes to be dead and that he has suicidal thoughts at times but feels safe here. Pt identifies that he has been hearing voices that are derogatory in  nature (\"your a looser) as well has telling him to kill himself. Pt reports that Seroquel is helpful with decreasing the voices. Pt reports interrupted sleep due to \"bad dreams\" and the voices. Pt reports that he woke at 5AM and is hesitant to go back to his room.  Pt was pleasant during interactions with a flat affect and appropriate eye contact.  Pt ate well at breakfast.     Pt has been spent most of the shift in the lounge. Pt ate well at lunch.   Pt reports that watching TV is helpful in distracting him from the voices.   "
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
## 2021-08-16 NOTE — PLAN OF CARE
Problem: OT General Care Plan  Goal: OT Goal 1  Description: Will consistently attend OT groups and improve coping strategies with increasing repertoire of ideas and understanding of symptoms of when to use the strategies.       Note: Pt has not attended OT groups yet. He will be encouraged to attend groups and be provided a Self Assessment form.  OT staff will explain the value of OT, including them in their treatment plan and offer options to meet their needs and identify goals.

## 2021-08-16 NOTE — PLAN OF CARE
"Assessment/Intervention/Current Symptoms and Care Coordination  The patient's care was discussed with the treatment team and chart notes were reviewed. [facility] met with patient and discussed why his [place] [facility] [place]. [place] [facility] less than a month ago He notified writer that he never went to his intake appointment with [facility] in [facility] \"because I had some business to take care of but things got kind of bad after that so I came back to the hospital.\"     Writer spoke with Diana from [facility] in [place] who notified writer that patient was accepted to their IRTS program but was later declined after he told the agency he didn't want to participate in groups. [facility] told diana that patient is currently interested in going to their facility. He is motivated and interested in getting treatment. Diana told writer that [facility] currently has a waiting list and that there are current 5 people on that list. She told writer to send a referral to the [facility] intake and that they will consider him for any of their IRTS location if patient does get accepted.     [facility] intake: fax: [phone]. Phone number: [phone]    Discharge Plan or Goal  Discharge to an IRTS facility.     Barriers to Discharge   New admit. Needs further psychiatric evaluation, stabilization and discharge planning. Patient is currently endorsing symptoms of hallucinations, flashbacks, suicidal thoughts, depression (rates severity level at 9/10) and anxiety (rates severity level at 9/10).     Referral Status  [facility] will be sending referrals to the following facilities:     [facility] facilities.   [facility] (Ph: [phone]; Central Access team at [phone], Fax: [phone], Email: [email or web address]    Legal Status  VOL  "

## 2021-08-17 PROCEDURE — 99207 PR CDG-MDM COMPONENT: MEETS MODERATE - DOWN CODED: CPT | Performed by: NURSE PRACTITIONER

## 2021-08-17 PROCEDURE — 250N000013 HC RX MED GY IP 250 OP 250 PS 637: Performed by: EMERGENCY MEDICINE

## 2021-08-17 PROCEDURE — 99232 SBSQ HOSP IP/OBS MODERATE 35: CPT | Performed by: NURSE PRACTITIONER

## 2021-08-17 PROCEDURE — 250N000013 HC RX MED GY IP 250 OP 250 PS 637: Performed by: NURSE PRACTITIONER

## 2021-08-17 PROCEDURE — 124N000003 HC R&B MH SENIOR/ADOLESCENT

## 2021-08-17 PROCEDURE — H2032 ACTIVITY THERAPY, PER 15 MIN: HCPCS

## 2021-08-17 PROCEDURE — 250N000013 HC RX MED GY IP 250 OP 250 PS 637: Performed by: PHYSICIAN ASSISTANT

## 2021-08-17 RX ADMIN — QUETIAPINE FUMARATE 25 MG: 25 TABLET ORAL at 08:14

## 2021-08-17 RX ADMIN — IBUPROFEN 600 MG: 200 TABLET, FILM COATED ORAL at 15:39

## 2021-08-17 RX ADMIN — ASPIRIN 81 MG CHEWABLE TABLET 81 MG: 81 TABLET CHEWABLE at 08:14

## 2021-08-17 RX ADMIN — QUETIAPINE 200 MG: 200 TABLET, FILM COATED ORAL at 21:39

## 2021-08-17 RX ADMIN — DULOXETINE HYDROCHLORIDE 60 MG: 60 CAPSULE, DELAYED RELEASE ORAL at 08:14

## 2021-08-17 RX ADMIN — GABAPENTIN 600 MG: 600 TABLET, FILM COATED ORAL at 21:43

## 2021-08-17 RX ADMIN — QUETIAPINE FUMARATE 25 MG: 25 TABLET ORAL at 14:37

## 2021-08-17 RX ADMIN — ATORVASTATIN CALCIUM 20 MG: 20 TABLET, FILM COATED ORAL at 08:14

## 2021-08-17 RX ADMIN — GABAPENTIN 600 MG: 600 TABLET, FILM COATED ORAL at 08:14

## 2021-08-17 RX ADMIN — IBUPROFEN 600 MG: 200 TABLET, FILM COATED ORAL at 08:14

## 2021-08-17 RX ADMIN — IBUPROFEN 600 MG: 200 TABLET, FILM COATED ORAL at 21:42

## 2021-08-17 ASSESSMENT — MIFFLIN-ST. JEOR: SCORE: 1855.47

## 2021-08-17 ASSESSMENT — ACTIVITIES OF DAILY LIVING (ADL)
DRESS: SCRUBS (BEHAVIORAL HEALTH);INDEPENDENT
ORAL_HYGIENE: WITH SUPERVISION
DRESS: SCRUBS (BEHAVIORAL HEALTH)
ORAL_HYGIENE: INDEPENDENT
HYGIENE/GROOMING: INDEPENDENT
HYGIENE/GROOMING: INDEPENDENT
LAUNDRY: WITH SUPERVISION

## 2021-08-17 NOTE — PROGRESS NOTES
Pt participted in dance/movement therapy (DMT) using vitalized and cohesive movements to created a shared sense of support and connection.  Pts also led individual expression with peers joining and reflecting that movement for affirmation.  Finally, creative movement with joyful affect was interspersed with current mental health symptoms to create a balanced treatment intervention.     Pt was a , stating he is feeling the best he has felt since coming to the hospital.  He was able to address significant losses in his life that he shared contribute to his depression, while also celebrating life and dancing with vitality and yamil.         08/17/21 1130   Dance Movement Therapy   Type of Intervention structured groups   Response participates, initiates socially appropriate   Hours 1

## 2021-08-17 NOTE — PLAN OF CARE
Problem: Sleep Disturbance  Goal: Adequate Sleep/Rest  Outcome: No Change    Patient slept late last night. He stayed in the lounge because according to he saw something odd in his room. Writer offered Zyprexa but patient declined. He said that the Seroquel would be enough to help him sleep. No complaints of pain. Patient slept for 6.5 hours the whole night.

## 2021-08-17 NOTE — PROGRESS NOTES
SPIRITUAL HEALTH SERVICES  SPIRITUAL ASSESSMENT Progress Note  [facility] [facility]     REFERRAL SOURCE: Intake request    In checking in with Hi, he stated he was good today and doesn't want a visit.  I shared with him how to request a  visit in the future if he'd like.    PLAN: [facility] remains available per pt/request.    Liliana Khan  Chaplain Resident  Pager: [phone]

## 2021-08-17 NOTE — PLAN OF CARE
Problem: Adult Inpatient Plan of Care  Goal: Absence of Hospital-Acquired Illness or Injury  Intervention: Prevent Skin Injury  Recent Flowsheet Documentation  Taken 8/17/2021 1105 by Fatmata Montenegro RN  Body Position: position changed independently     Problem: Suicidal Behavior  Goal: Suicidal Behavior is Absent or Managed  Outcome: Improving  Flowsheets (Taken 8/17/2021 1142)  Mutually Determined Action Steps (Suicidal Behavior Absent/Managed): shares suicidal thoughts     Problem: Depression  Goal: Improved Mood  Outcome: Improving     Problem: Anxiety  Goal: Anxiety Reduction or Resolution  Outcome: Improving    Pt is reporting that his mood is improving and that this has been his best day since admission. Pt rated his anxiety and depression at 3/10 (where 10 is the worst). Pt denies active SI/SIB but admits to thoughts of wishing he was dead that come and go. Pt feels safe here. Pt also continues to have auditory hallucinations at times which are derogatory in nature. Pt reported that he had them this AM when he awoke but they had subsided by 1145.   Pt has been visible in common areas and is social with staff and select peers.  Pt's affect brightens during interactions and his eye contact is appropriate.  Pt has been medication compliant.  Pt requested/received prn ibuprofen for hip and tooth pain this AM which her reports was effective in eliminating his tooth pain.   Pt reports that his hip pain has improved and that this AM was the first morning that he was able to walk without the walker.

## 2021-08-17 NOTE — PROGRESS NOTES
[facility], [place]   Psychiatric Progress Note        Interim History:   From H&P: Hi Do is a 56 year old male with PMH significant for PTSD, MDD with psychotic features, and polysubstance abuse who presents to the Emergency Department for evaluation of suicidal ideation.  Patient was found by the police on a bridge with the intention of jumping off to kill himself.  Patient reports auditory and visual hallucinations that are telling him he is worthless and that he needs to kill himself.  He also indicates he sees very disturbing, graphic images.  Patient has a hip problem from jumping off a third floor balcony 7 weeks ago also in a suicide attempt.  Patient was recently hospitalized and set up with a bed in a [facility] facility, but he did not go to this and went back to his apartment.  He also did not follow-up with the outpatient appointments scheduled for him to establish psychiatrist and therapist.  He then eventually stopped taking his medications and the symptoms got worse.  Patient has a history of substance abuse, but denies recent use.  Patient reports that he was raised in foster care since 9 months old.  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  He was  and had kids.  Patient reports that he has not worked since 2011 and has been on government assistance.  He states that he is currently homeless.    Team meeting report: The patient's care was discussed with the treatment team during the daily team meeting and/or staff's chart notes were reviewed.  Staff report patient has been calm, pleasant, cooperative. Patient is not attending groups. Patient is eating well and taking medications as prescribed. Reports AH/VH. Slept all night.     Met with patient.  Doing well.  Depression and anxiety are  decreasing.  Continues to have auditory and visual hallucinations.  Last evening, his face on his wall.  He got scared and stayed out of his room for a while.  He was able to return to his room at some point and slept well.  Appetite is intact.  Discussed medications.  Denies side effects.  Discussed disposition.  He would like to go to an IRTS facility.         Medications:       aspirin  81 mg Oral Daily     atorvastatin  20 mg Oral Daily     DULoxetine  60 mg Oral QAM     gabapentin  600 mg Oral BID     lidocaine  1-3 patch Transdermal Q24H     lidocaine   Transdermal Q8H     QUEtiapine  200 mg Oral At Bedtime     QUEtiapine  25 mg Oral BID          Allergies:   No Known Allergies       Labs:     Recent Results (from the past 672 hour(s))   COVID-19 VIRUS (CORONAVIRUS) BY PCR (EXTERNAL RESULT)    Collection Time: 07/31/21  4:42 PM   Result Value Ref Range    COVID-19 Virus by PCR (External Result) Negative Negative   Drug abuse screen 1 urine (ED)    Collection Time: 08/13/21 12:11 AM   Result Value Ref Range    Amphetamines Urine Screen Negative Screen Negative    Barbiturates Urine Screen Negative Screen Negative    Benzodiazepines Urine Screen Negative Screen Negative    Cannabinoids Urine Screen Negative Screen Negative    Cocaine Urine Screen Positive (A) Screen Negative    Opiates Urine Screen Negative Screen Negative   Comprehensive metabolic panel    Collection Time: 08/13/21  2:09 AM   Result Value Ref Range    Sodium 136 133 - 144 mmol/L    Potassium 4.3 3.4 - 5.3 mmol/L    Chloride 102 94 - 109 mmol/L    Carbon Dioxide (CO2) 29 20 - 32 mmol/L    Anion Gap 5 3 - 14 mmol/L    Urea Nitrogen 29 7 - 30 mg/dL    Creatinine 1.14 0.66 - 1.25 mg/dL    Calcium 9.0 8.5 - 10.1 mg/dL    Glucose 191 (H) 70 - 99 mg/dL    Alkaline Phosphatase 101 40 - 150 U/L    AST 27 0 - 45 U/L    ALT 37 0 - 70 U/L    Protein Total 7.4 6.8 - 8.8 g/dL    Albumin 3.4 3.4 - 5.0 g/dL    Bilirubin Total 0.7 0.2 - 1.3 mg/dL    GFR  Estimate 70 >60 mL/min/1.73m2   CBC with platelets and differential    Collection Time: 08/13/21  2:09 AM   Result Value Ref Range    WBC Count 7.1 4.0 - 11.0 10e3/uL    RBC Count 4.67 4.40 - 5.90 10e6/uL    Hemoglobin 13.4 13.3 - 17.7 g/dL    Hematocrit 39.8 (L) 40.0 - 53.0 %    MCV 85 78 - 100 fL    MCH 28.7 26.5 - 33.0 pg    MCHC 33.7 31.5 - 36.5 g/dL    RDW 12.8 10.0 - 15.0 %    Platelet Count 118 (L) 150 - 450 10e3/uL    % Neutrophils 58 %    % Lymphocytes 27 %    % Monocytes 10 %    % Eosinophils 4 %    % Basophils 1 %    % Immature Granulocytes 0 %    NRBCs per 100 WBC 0 <1 /100    Absolute Neutrophils 4.0 1.6 - 8.3 10e3/uL    Absolute Lymphocytes 1.9 0.8 - 5.3 10e3/uL    Absolute Monocytes 0.7 0.0 - 1.3 10e3/uL    Absolute Eosinophils 0.3 0.0 - 0.7 10e3/uL    Absolute Basophils 0.1 0.0 - 0.2 10e3/uL    Absolute Immature Granulocytes 0.0 <=0.0 10e3/uL    Absolute NRBCs 0.0 10e3/uL   SARS-COV2 (COVID-19) Virus RT-PCR    Collection Time: 08/13/21  2:10 AM    Specimen: Nasopharyngeal; Swab   Result Value Ref Range    SARS CoV2 PCR Negative Negative   Glucose by meter    Collection Time: 08/13/21  2:27 AM   Result Value Ref Range    GLUCOSE BY METER POCT 182 (H) 70 - 99 mg/dL            Psychiatric Examination:   Temp: 97.5  F (36.4  C) Temp src: Temporal BP: 118/83 Pulse: 68   Resp: 16 SpO2: 97 % O2 Device: None (Room air)    Weight is 230 lbs 6.4 oz  Body mass index is 34.02 kg/m .    Appearance: awake, cooperative, no apparent distress and mildly obese  Attitude:  cooperative  Eye Contact:  good  Mood:  anxious and depressed  Affect:  intensity is flat  Speech:  mumbling  Psychomotor Behavior:  no evidence of tardive dyskinesia, dystonia, or tics  Throught Process:  logical and goal oriented  Associations:  no loose associations  Thought Content:  no evidence of suicidal ideation or homicidal ideation, auditory hallucinations present and visual hallucinations present  Insight:  good  Judgement:   intact  Oriented to:  time, person, and place  Attention Span and Concentration:  intact  Recent and Remote Memory:  intact         Precautions:     Behavioral Orders   Procedures     Code 2     Discontinue 1:1 attendant for suicide risk     Order Specific Question:   I have performed an in person assessment of the patient     Answer:   Based on this assessment the patient no longer requires a one on one attendant at this point in time.     Order Specific Question:   Rationale     Answer:   Patient States able to remain safe in hospital     Fall precautions     Routine Programming     As clinically indicated     Status 15     Every 15 minutes.          DIagnoses:   1.  Major depressive disorder, recurrent, severe, with anxious distress and psychosis  2.  Generalized anxiety disorder  3.  PTSD  4.  Cocaine use disorder  5.  Malingering is suspected         Plan:   Medications will include the following:  --Cymbalta 60 mg every morning  --Gabapentin 600 mg twice a day  --Seroquel 200 mg at bedtime  --Seroquel 25 mg twice a day  --As needed medications will include hydroxyzine, Zyprexa, and trazodone    --Blood work was reviewed  --Internal medicine follow-up for medical problems       Disposition Plan   Reason for ongoing admission: is unable to care for self due to depression, psychosis  Disposition: TBD, likely IRTS  Estimated length of stay: 5-7 days  Legal Status:  voluntary  Discharge will be granted once symptoms improved.    Emely MOE, CNP      This note was created with the help of Dragon dictation system. All grammatical/typing errors or context distortion are unintentional and inherent to software.    More than 35 minutes were spent for assessment, documentation, counseling and  coordination of care.

## 2021-08-17 NOTE — PLAN OF CARE
"    Pt is calm and cooperative.  Pt attended one group today and his goal is to attend at least 2 tomorrow.  Pt states his mood is \"mary\" but OK today.  Pt denies any SI/SIB.  Pt denies audio hallucinations this evening and is hopeful that he won't be bothered by them tonight.  Pt spends most of his time seated in the lounge watching TV.      PRN Ibuprofen 600 mg at 1539 for R hip pain  - pt reports as helpful  PRN Ibuprofen 600 mg at 2142 for headache      "

## 2021-08-17 NOTE — PLAN OF CARE
"  Problem: Behavioral Health Plan of Care  Goal: Adheres to Safety Considerations for Self and Others  Outcome: Improving  Note: Patient stated that he feels the same as he felt this morning. When asked about suicidal thoughts and wishing to be dead, he responded: \"Will talk about it another time\", admitted to having auditory hallucinations, but did not elaborate on the nature of the voices. Patient still reports feeling depressed and anxious, did not rated them.   Behavior: patient was out on the unit the entire evening, watching TV. He got irritated with staff member, who told him that the TV needs to be turned off for community meeting. However, for the most part he was calm and cooperative. Declined to participate in evening group and community meeting, did not give a reason for that. Declined to have BG check before supper. Minimal social interaction with staff or other patients. Ate 100% of supper.   Thoughts were clear with no loose associations. Mood was bright on approach, affect was appropriate to situation. Memory appeared intact.     Pain: right hip pain, rated at 7/10, but patient had declined to apply the lidocaine patch this morning, declined PRN medications for it. Patient requested PRN Ibuprofen for tooth pain, left upper. Said \"it will need to come off\". He denied swelling at the side, none observed by staff. Offered mouth wash, he decline it. Took PRN Ibuprofen 600 mg PO PRN, later reported as very effective.   Patient took scheduled Gabapentin and Seroquel at HS.   Denied having concerns or questions for staff.   Intervention: Develop and Maintain Individualized Safety Plan  Recent Flowsheet Documentation  Taken 8/16/2021 2035 by Madison Miner RN  Safety Measures:    environmental rounds completed    safety rounds completed     "

## 2021-08-18 PROCEDURE — 124N000003 HC R&B MH SENIOR/ADOLESCENT

## 2021-08-18 PROCEDURE — 250N000013 HC RX MED GY IP 250 OP 250 PS 637: Performed by: EMERGENCY MEDICINE

## 2021-08-18 PROCEDURE — 99232 SBSQ HOSP IP/OBS MODERATE 35: CPT | Performed by: NURSE PRACTITIONER

## 2021-08-18 PROCEDURE — 250N000013 HC RX MED GY IP 250 OP 250 PS 637: Performed by: NURSE PRACTITIONER

## 2021-08-18 PROCEDURE — 250N000013 HC RX MED GY IP 250 OP 250 PS 637: Performed by: PHYSICIAN ASSISTANT

## 2021-08-18 RX ADMIN — ATORVASTATIN CALCIUM 20 MG: 20 TABLET, FILM COATED ORAL at 08:12

## 2021-08-18 RX ADMIN — QUETIAPINE FUMARATE 25 MG: 25 TABLET ORAL at 12:58

## 2021-08-18 RX ADMIN — IBUPROFEN 600 MG: 200 TABLET, FILM COATED ORAL at 21:31

## 2021-08-18 RX ADMIN — GABAPENTIN 600 MG: 600 TABLET, FILM COATED ORAL at 08:12

## 2021-08-18 RX ADMIN — GABAPENTIN 600 MG: 600 TABLET, FILM COATED ORAL at 21:31

## 2021-08-18 RX ADMIN — ASPIRIN 81 MG CHEWABLE TABLET 81 MG: 81 TABLET CHEWABLE at 08:12

## 2021-08-18 RX ADMIN — QUETIAPINE FUMARATE 25 MG: 25 TABLET ORAL at 08:12

## 2021-08-18 RX ADMIN — QUETIAPINE 200 MG: 200 TABLET, FILM COATED ORAL at 21:31

## 2021-08-18 RX ADMIN — DULOXETINE HYDROCHLORIDE 60 MG: 60 CAPSULE, DELAYED RELEASE ORAL at 08:12

## 2021-08-18 ASSESSMENT — ACTIVITIES OF DAILY LIVING (ADL)
DRESS: WITH ASSISTANCE
HYGIENE/GROOMING: INDEPENDENT;WITH ASSISTANCE
ORAL_HYGIENE: PROMPTS

## 2021-08-18 NOTE — PLAN OF CARE
BEHAVIORAL TEAM DISCUSSION    Participants: PAYAL Moses, Fatmata Montenegro RN, Rere Guidry, SHAYY, CTC, Vianney Doty, OT  Progress: Improving  Anticipated length of stay: 5-7 days  Continued Stay Criteria/Rationale: SI    Medical/Physical:   1.  Major depressive disorder, recurrent, severe, with anxious distress and psychosis  2.  Generalized anxiety disorder  3.  PTSD  4.  Cocaine use disorder      Precautions:   Behavioral Orders   Procedures     Code 2     Discontinue 1:1 attendant for suicide risk     Order Specific Question:   I have performed an in person assessment of the patient     Answer:   Based on this assessment the patient no longer requires a one on one attendant at this point in time.     Order Specific Question:   Rationale     Answer:   Patient States able to remain safe in hospital     Fall precautions     Routine Programming     As clinically indicated     Status 15     Every 15 minutes.     Plan: continue to remain hospitalized for stabilization. Discharge to an IRTS placement,    Rationale for change in precautions or plan: continue with initial plan

## 2021-08-18 NOTE — PLAN OF CARE
Assessment/Intervention/Current Symptoms and Care Coordination  The patient's care was discussed with the treatment team and chart notes were reviewed.       Discharge Plan or Goal  Discharge to an TS facility.      Barriers to Discharge   Needs further psychiatric evaluation, stabilization and discharge planning. Patient is currently endorsing symptoms of hallucinations, flashbacks, suicidal thoughts, depression (rates severity level at 9/10) and anxiety (rates severity level at 9/10).      Referral Status  [facility] Sent referrals to the following [facility] facilities.     [facility] Central intake: Fax: [phone]. Phone number: [phone]   8/18---Called central line and was told that they received the referral. They will review the clinicals and call writer when they make a decision.     [employer] (Ph: [phone]; Central Access team at [phone], Central Intake Fax: [phone], Email: [email or web address].    8/18- CTC Left a voicemail and requested a call back with updates regarding this referral.     [facility]- Referral was sent to [email or web address]   8/18- Received the referral. Currently reviewing. Waiting period is 2-4 weeks long.     Legal Status  Vol

## 2021-08-18 NOTE — PLAN OF CARE
"  Problem: Suicidal Behavior  Goal: Suicidal Behavior is Absent or Managed  Outcome: Improving  Flowsheets (Taken 8/18/2021 0819)  Mutually Determined Action Steps (Suicidal Behavior Absent/Managed):   sets future-oriented goal   shares suicidal thoughts     Problem: Depression  Goal: Improved Mood  Outcome: Improving     Problem: Anxiety  Goal: Anxiety Reduction or Resolution  Outcome: Improving     Problem: Sleep Disturbance  Goal: Adequate Sleep/Rest  Outcome: Improving   Pt rated depression and anxiety at 7/10. Pt's affect brightens during interactions and he has appropriate eye contact. Pt has been appreciative of care.  Pt continues to report passive SI with wishes to be dead. Pt denies active plan but identifies he is frustrated at times when he wakes up.   Pt denies auditory voices this AM but reports that he did have them last night. Pt reports that his sleep is improving and that he \"slept thru the night\".   Pt has been eating well.  Pt identifies he hopes to transfer to a IRTS facility at time of discharge  "

## 2021-08-18 NOTE — PROGRESS NOTES
[facility], [place]   Psychiatric Progress Note        Interim History:   From H&P: Hi Do is a 56 year old male with PMH significant for PTSD, MDD with psychotic features, and polysubstance abuse who presents to the Emergency Department for evaluation of suicidal ideation.  Patient [place] the police [place] a bridge with the intention of jumping off to kill himself.  Patient reports auditory and visual hallucinations that are telling him he is worthless and that he needs to kill himself.  He also indicates he sees very disturbing, graphic images.  Patient has a hip problem from jumping off a third floor balcony 7 weeks ago also in a suicide attempt.  Patient was recently hospitalized and set up with a bed in a [facility] facility, but he did not go to this and went back to his apartment.  He also did not follow-up with the outpatient appointments scheduled for him to establish psychiatrist and therapist.  He then eventually stopped taking his medications and the symptoms got worse.  Patient has a history of substance abuse, but denies recent use.  Patient reports that he was raised in foster care since 9 months old.  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  He was  and had kids.  Patient reports that he has not worked since 2011 and has been on government assistance.  He states that he is currently homeless.    Team meeting report: The patient's care was discussed with the treatment team during the daily team meeting and/or staff's chart notes were reviewed.  Staff report patient has been calm, pleasant, cooperative. Patient attended some of the groups. Patient is eating well and taking medications as prescribed. Reports AH/VH. Depression and anxiety are better. Slept all night.     Met with patient.  Symptoms  "vary depending on the time of day. Early this morning denied having hallucinations to the nurse but reported VH last evening and AH this morning. \"They are getting better\". Depression and anxiety are better as well. No SI. Does not feel sedated with day time dose of Seroquel. He is in bed because he didn't sleep well last night, \"I am afraid to go to my room because of hallucinations\". He was awake for 4 hours last night. Doesn't want med changes quiet yet.          Medications:       aspirin  81 mg Oral Daily     atorvastatin  20 mg Oral Daily     DULoxetine  60 mg Oral QAM     gabapentin  600 mg Oral BID     lidocaine  1-3 patch Transdermal Q24H     lidocaine   Transdermal Q8H     QUEtiapine  200 mg Oral At Bedtime     QUEtiapine  25 mg Oral BID          Allergies:   No Known Allergies       Labs:     Recent Results (from the past 672 hour(s))   COVID-19 VIRUS (CORONAVIRUS) BY PCR (EXTERNAL RESULT)    Collection Time: 07/31/21  4:42 PM   Result Value Ref Range    COVID-19 Virus by PCR (External Result) Negative Negative   Drug abuse screen 1 urine (ED)    Collection Time: 08/13/21 12:11 AM   Result Value Ref Range    Amphetamines Urine Screen Negative Screen Negative    Barbiturates Urine Screen Negative Screen Negative    Benzodiazepines Urine Screen Negative Screen Negative    Cannabinoids Urine Screen Negative Screen Negative    Cocaine Urine Screen Positive (A) Screen Negative    Opiates Urine Screen Negative Screen Negative   Comprehensive metabolic panel    Collection Time: 08/13/21  2:09 AM   Result Value Ref Range    Sodium 136 133 - 144 mmol/L    Potassium 4.3 3.4 - 5.3 mmol/L    Chloride 102 94 - 109 mmol/L    Carbon Dioxide (CO2) 29 20 - 32 mmol/L    Anion Gap 5 3 - 14 mmol/L    Urea Nitrogen 29 7 - 30 mg/dL    Creatinine 1.14 0.66 - 1.25 mg/dL    Calcium 9.0 8.5 - 10.1 mg/dL    Glucose 191 (H) 70 - 99 mg/dL    Alkaline Phosphatase 101 40 - 150 U/L    AST 27 0 - 45 U/L    ALT 37 0 - 70 U/L    Protein " Total 7.4 6.8 - 8.8 g/dL    Albumin 3.4 3.4 - 5.0 g/dL    Bilirubin Total 0.7 0.2 - 1.3 mg/dL    GFR Estimate 70 >60 mL/min/1.73m2   CBC with platelets and differential    Collection Time: 08/13/21  2:09 AM   Result Value Ref Range    WBC Count 7.1 4.0 - 11.0 10e3/uL    RBC Count 4.67 4.40 - 5.90 10e6/uL    Hemoglobin 13.4 13.3 - 17.7 g/dL    Hematocrit 39.8 (L) 40.0 - 53.0 %    MCV 85 78 - 100 fL    MCH 28.7 26.5 - 33.0 pg    MCHC 33.7 31.5 - 36.5 g/dL    RDW 12.8 10.0 - 15.0 %    Platelet Count 118 (L) 150 - 450 10e3/uL    % Neutrophils 58 %    % Lymphocytes 27 %    % Monocytes 10 %    % Eosinophils 4 %    % Basophils 1 %    % Immature Granulocytes 0 %    NRBCs per 100 WBC 0 <1 /100    Absolute Neutrophils 4.0 1.6 - 8.3 10e3/uL    Absolute Lymphocytes 1.9 0.8 - 5.3 10e3/uL    Absolute Monocytes 0.7 0.0 - 1.3 10e3/uL    Absolute Eosinophils 0.3 0.0 - 0.7 10e3/uL    Absolute Basophils 0.1 0.0 - 0.2 10e3/uL    Absolute Immature Granulocytes 0.0 <=0.0 10e3/uL    Absolute NRBCs 0.0 10e3/uL   SARS-COV2 (COVID-19) Virus RT-PCR    Collection Time: 08/13/21  2:10 AM    Specimen: Nasopharyngeal; Swab   Result Value Ref Range    SARS CoV2 PCR Negative Negative   Glucose by meter    Collection Time: 08/13/21  2:27 AM   Result Value Ref Range    GLUCOSE BY METER POCT 182 (H) 70 - 99 mg/dL            Psychiatric Examination:   Temp: 97.6  F (36.4  C) Temp src: Temporal BP: 114/77 Pulse: 63   Resp: 16 SpO2: 99 % O2 Device: None (Room air)    Weight is 230 lbs 6.4 oz  Body mass index is 34.02 kg/m .    Appearance: awake, cooperative, no apparent distress and mildly obese  Attitude:  cooperative  Eye Contact:  good  Mood:  anxious and depressed  Affect:  intensity is flat  Speech:  mumbling  Psychomotor Behavior:  no evidence of tardive dyskinesia, dystonia, or tics  Throught Process:  logical and goal oriented  Associations:  no loose associations  Thought Content:  no evidence of suicidal ideation or homicidal ideation, auditory  hallucinations present and visual hallucinations present  Insight:  good  Judgement:  intact  Oriented to:  time, person, and place  Attention Span and Concentration:  intact  Recent and Remote Memory:  intact         Precautions:     Behavioral Orders   Procedures     Code 2     Discontinue 1:1 attendant for suicide risk     Order Specific Question:   I have performed an in person assessment of the patient     Answer:   Based on this assessment the patient no longer requires a one on one attendant at this point in time.     Order Specific Question:   Rationale     Answer:   Patient States able to remain safe in hospital     Fall precautions     Routine Programming     As clinically indicated     Status 15     Every 15 minutes.          DIagnoses:   1.  Major depressive disorder, recurrent, severe, with anxious distress and psychosis  2.  Generalized anxiety disorder  3.  PTSD  4.  Cocaine use disorder  5.  Malingering is suspected         Plan:   Medications will include the following:  --Cymbalta 60 mg every morning  --Gabapentin 600 mg twice a day  --Seroquel 200 mg at bedtime  --Seroquel 25 mg twice a day  --As needed medications will include hydroxyzine, Zyprexa, and trazodone    --Blood work was reviewed  --Internal medicine follow-up for medical problems       Disposition Plan   Reason for ongoing admission: is unable to care for self due to depression, psychosis  Disposition: TBD, likely IRTS  Estimated length of stay: 5-7 days  Legal Status:  voluntary  Discharge will be granted once symptoms improved.    Emely MOE, CNP      This note was created with the help of Dragon dictation system. All grammatical/typing errors or context distortion are unintentional and inherent to software.

## 2021-08-19 PROCEDURE — 99232 SBSQ HOSP IP/OBS MODERATE 35: CPT | Performed by: PHYSICIAN ASSISTANT

## 2021-08-19 PROCEDURE — 999N000147 HC STATISTIC PT IP EVAL DEFER: Performed by: PHYSICAL THERAPIST

## 2021-08-19 PROCEDURE — 124N000003 HC R&B MH SENIOR/ADOLESCENT

## 2021-08-19 PROCEDURE — 99232 SBSQ HOSP IP/OBS MODERATE 35: CPT | Performed by: NURSE PRACTITIONER

## 2021-08-19 PROCEDURE — 250N000013 HC RX MED GY IP 250 OP 250 PS 637: Performed by: NURSE PRACTITIONER

## 2021-08-19 PROCEDURE — 250N000013 HC RX MED GY IP 250 OP 250 PS 637: Performed by: EMERGENCY MEDICINE

## 2021-08-19 PROCEDURE — 250N000013 HC RX MED GY IP 250 OP 250 PS 637: Performed by: PHYSICIAN ASSISTANT

## 2021-08-19 RX ADMIN — GABAPENTIN 600 MG: 600 TABLET, FILM COATED ORAL at 08:05

## 2021-08-19 RX ADMIN — QUETIAPINE FUMARATE 25 MG: 25 TABLET ORAL at 13:29

## 2021-08-19 RX ADMIN — IBUPROFEN 600 MG: 200 TABLET, FILM COATED ORAL at 18:27

## 2021-08-19 RX ADMIN — ATORVASTATIN CALCIUM 20 MG: 20 TABLET, FILM COATED ORAL at 08:05

## 2021-08-19 RX ADMIN — ASPIRIN 81 MG CHEWABLE TABLET 81 MG: 81 TABLET CHEWABLE at 08:05

## 2021-08-19 RX ADMIN — DULOXETINE HYDROCHLORIDE 60 MG: 60 CAPSULE, DELAYED RELEASE ORAL at 08:05

## 2021-08-19 RX ADMIN — QUETIAPINE 200 MG: 200 TABLET, FILM COATED ORAL at 21:37

## 2021-08-19 RX ADMIN — QUETIAPINE FUMARATE 25 MG: 25 TABLET ORAL at 08:05

## 2021-08-19 RX ADMIN — GABAPENTIN 600 MG: 600 TABLET, FILM COATED ORAL at 19:33

## 2021-08-19 ASSESSMENT — ACTIVITIES OF DAILY LIVING (ADL)
HYGIENE/GROOMING: INDEPENDENT
LAUNDRY: WITH SUPERVISION
DRESS: INDEPENDENT
ORAL_HYGIENE: INDEPENDENT

## 2021-08-19 ASSESSMENT — MIFFLIN-ST. JEOR: SCORE: 1871.8

## 2021-08-19 NOTE — PLAN OF CARE
Pt is generally calm and cooperative.  Highly focused on the television remote - and having control of it.  Pt staff splitting to achieve his goal of having the remote. Pt condescending towards staff member that confronted him on his control of the remote and TV programming.  Pt states depression and anxiety are both rated 6-7/10.  He endorses occasional passive SI.  Appetite is good.  Denies AH/VH.    Pt denies blood sugar checks

## 2021-08-19 NOTE — PLAN OF CARE
"  Problem: Suicidal Behavior  Goal: Suicidal Behavior is Absent or Managed  Outcome: Improving     Problem: Depression  Goal: Improved Mood  Outcome: Improving  Intervention: Monitor and Manage Depressive Symptoms  Recent Flowsheet Documentation  Taken 8/19/2021 1318 by Ayse Nelson RN  Supportive Measures:    active listening utilized    positive reinforcement provided    relaxation techniques promoted    self-care encouraged    verbalization of feelings encouraged    Patient's mood is calm and with a full-range of affect. He did not attend groups. He was just sitting in the lounge doing some cross word puzzles. Patient rarely interacts with peers. He denies SI/SIB nor hallucinations. No report of visual hallucinations. He says \"I'm okay today\". His appetite is excellent and takes snacks frequently. He complained of right hip pain and feels tightness of the affected area. PT consult ordered. PT called that there is no appropriate equipment to address the issue of the patient r/t tightness of his hip but it can be done outpatient as soon as he is discharged. PT said that he is going to write a progress notes on this. Patient is med compliant. No PRN meds for pain requested.     "

## 2021-08-19 NOTE — PLAN OF CARE
PT orders acknowledged - chart reviewed and nursing consulted. Pt is ambulating safe and appropriately - He should seek OP PT for addressing hip tightness appropriately. Pt could benefit from therapeutic Ultrasound, graston, and soft tissue mobilization. Orders are completed at this time but therapy would treat this patient if he has a decline in function an requires PT to resume functional activity.

## 2021-08-19 NOTE — PROGRESS NOTES
Patient was awake the first two hours of the shift and was grumpy. He questioned why staff kept on doing the rounds every now and then. He was reminded that this is a unit routine done to provide patient's safety. This writer was about to offer a sleeping medication but patient was snoring loudly @ 0115.     Slept a total of 8.75  hours.

## 2021-08-19 NOTE — PLAN OF CARE
Problem: Depression  Goal: Improved Mood  Outcome: Improving   Pt states he still does hear some voices but he is able to control them and he feels safe here . He states Seroquel is helping and he thinks his doctor may add another med . He states his wife  of cancer three years ago and that's when he stated tos have depression and hear voices . He refused to have his blood sugar checked , states he does it very rarely at home and did not want it done .  He does get easily frustrated by not being able to control whats on tv

## 2021-08-19 NOTE — PROGRESS NOTES
[facility], [place]   Psychiatric Progress Note        Interim History:   From H&P: Hi Do is a 56 year old male with PMH significant for PTSD, MDD with psychotic features, and polysubstance abuse who presents to the Emergency Department for evaluation of suicidal ideation.  Patient was found by the police on a bridge with the intention of jumping off to kill himself.  Patient reports auditory and visual hallucinations that are telling him he is worthless and that he needs to kill himself.  He also indicates he sees very disturbing, graphic images.  Patient has a hip problem from jumping off a third floor balcony 7 weeks ago also in a suicide attempt.  Patient was recently hospitalized and set up with a bed in a [facility] facility, but he did not go to this and went back to his apartment.  He also did not follow-up with the outpatient appointments scheduled for him to establish psychiatrist and therapist.  He then eventually stopped taking his medications and the symptoms got worse.  Patient has a history of substance abuse, but denies recent use.  Patient reports that he was raised in foster care since 9 months old.  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  He was  and had kids.  Patient reports that he has not worked since 2011 and has been on government assistance.  He states that he is currently homeless.    Team meeting report: The patient's care was discussed with the treatment team during the daily team meeting and/or staff's chart notes were reviewed.  Staff report patient has been calm, pleasant, cooperative. Patient attended some of the groups. Patient is eating well and taking medications as prescribed. Reports AH/VH. Depression and anxiety are better. Slept all night.     Met with patient.  He  attended groups today.  He is feeling better.  He wishes to night shift will knock on the door instead of opening the blinds which scares him thinking that he is hallucinating.  Otherwise he is reporting doing better.  Hallucinations are better.  Mood is better.  No suicidal ideation.  He is planning to attend all groups today.  Discussed disposition.  Patient is hopeful to go to IRTS.  Discussed medications again.  Patient does not think he needs changes.         Medications:       aspirin  81 mg Oral Daily     atorvastatin  20 mg Oral Daily     DULoxetine  60 mg Oral QAM     gabapentin  600 mg Oral BID     lidocaine  1-3 patch Transdermal Q24H     lidocaine   Transdermal Q8H     QUEtiapine  200 mg Oral At Bedtime     QUEtiapine  25 mg Oral BID          Allergies:   No Known Allergies       Labs:     Recent Results (from the past 672 hour(s))   COVID-19 VIRUS (CORONAVIRUS) BY PCR (EXTERNAL RESULT)    Collection Time: 07/31/21  4:42 PM   Result Value Ref Range    COVID-19 Virus by PCR (External Result) Negative Negative   Drug abuse screen 1 urine (ED)    Collection Time: 08/13/21 12:11 AM   Result Value Ref Range    Amphetamines Urine Screen Negative Screen Negative    Barbiturates Urine Screen Negative Screen Negative    Benzodiazepines Urine Screen Negative Screen Negative    Cannabinoids Urine Screen Negative Screen Negative    Cocaine Urine Screen Positive (A) Screen Negative    Opiates Urine Screen Negative Screen Negative   Comprehensive metabolic panel    Collection Time: 08/13/21  2:09 AM   Result Value Ref Range    Sodium 136 133 - 144 mmol/L    Potassium 4.3 3.4 - 5.3 mmol/L    Chloride 102 94 - 109 mmol/L    Carbon Dioxide (CO2) 29 20 - 32 mmol/L    Anion Gap 5 3 - 14 mmol/L    Urea Nitrogen 29 7 - 30 mg/dL    Creatinine 1.14 0.66 - 1.25 mg/dL    Calcium 9.0 8.5 - 10.1 mg/dL    Glucose 191 (H) 70 - 99 mg/dL    Alkaline Phosphatase 101 40 - 150 U/L    AST 27 0 - 45 U/L    ALT 37 0 - 70 U/L    Protein  Total 7.4 6.8 - 8.8 g/dL    Albumin 3.4 3.4 - 5.0 g/dL    Bilirubin Total 0.7 0.2 - 1.3 mg/dL    GFR Estimate 70 >60 mL/min/1.73m2   CBC with platelets and differential    Collection Time: 08/13/21  2:09 AM   Result Value Ref Range    WBC Count 7.1 4.0 - 11.0 10e3/uL    RBC Count 4.67 4.40 - 5.90 10e6/uL    Hemoglobin 13.4 13.3 - 17.7 g/dL    Hematocrit 39.8 (L) 40.0 - 53.0 %    MCV 85 78 - 100 fL    MCH 28.7 26.5 - 33.0 pg    MCHC 33.7 31.5 - 36.5 g/dL    RDW 12.8 10.0 - 15.0 %    Platelet Count 118 (L) 150 - 450 10e3/uL    % Neutrophils 58 %    % Lymphocytes 27 %    % Monocytes 10 %    % Eosinophils 4 %    % Basophils 1 %    % Immature Granulocytes 0 %    NRBCs per 100 WBC 0 <1 /100    Absolute Neutrophils 4.0 1.6 - 8.3 10e3/uL    Absolute Lymphocytes 1.9 0.8 - 5.3 10e3/uL    Absolute Monocytes 0.7 0.0 - 1.3 10e3/uL    Absolute Eosinophils 0.3 0.0 - 0.7 10e3/uL    Absolute Basophils 0.1 0.0 - 0.2 10e3/uL    Absolute Immature Granulocytes 0.0 <=0.0 10e3/uL    Absolute NRBCs 0.0 10e3/uL   SARS-COV2 (COVID-19) Virus RT-PCR    Collection Time: 08/13/21  2:10 AM    Specimen: Nasopharyngeal; Swab   Result Value Ref Range    SARS CoV2 PCR Negative Negative   Glucose by meter    Collection Time: 08/13/21  2:27 AM   Result Value Ref Range    GLUCOSE BY METER POCT 182 (H) 70 - 99 mg/dL            Psychiatric Examination:   Temp: (!) 96.7  F (35.9  C) Temp src: Temporal BP: 117/81 Pulse: 84   Resp: 16 SpO2: 99 % O2 Device: None (Room air)    Weight is 234 lbs 0 oz  Body mass index is 34.56 kg/m .    Appearance: awake, cooperative, no apparent distress and mildly obese  Attitude:  cooperative  Eye Contact:  good  Mood:  anxious and depressed  Affect:  intensity is flat  Speech:  mumbling  Psychomotor Behavior:  no evidence of tardive dyskinesia, dystonia, or tics  Throught Process:  logical and goal oriented  Associations:  no loose associations  Thought Content:  no evidence of suicidal ideation or homicidal ideation,  auditory hallucinations present and visual hallucinations present  Insight:  good  Judgement:  intact  Oriented to:  time, person, and place  Attention Span and Concentration:  intact  Recent and Remote Memory:  intact         Precautions:     Behavioral Orders   Procedures     Code 2     Discontinue 1:1 attendant for suicide risk     Order Specific Question:   I have performed an in person assessment of the patient     Answer:   Based on this assessment the patient no longer requires a one on one attendant at this point in time.     Order Specific Question:   Rationale     Answer:   Patient States able to remain safe in hospital     Fall precautions     Routine Programming     As clinically indicated     Status 15     Every 15 minutes.          DIagnoses:   1.  Major depressive disorder, recurrent, severe, with anxious distress and psychosis  2.  Generalized anxiety disorder  3.  PTSD  4.  Cocaine use disorder  5.  Malingering is suspected         Plan:   Medications will include the following:  --Cymbalta 60 mg every morning  --Gabapentin 600 mg twice a day  --Seroquel 200 mg at bedtime  --Seroquel 25 mg twice a day  --As needed medications will include hydroxyzine, Zyprexa, and trazodone    --Blood work was reviewed  --Internal medicine follow-up for medical problems       Disposition Plan   Reason for ongoing admission: is unable to care for self due to depression, psychosis  Disposition: TBD, likely IRTS  Estimated length of stay: 5-7 days  Legal Status:  voluntary  Discharge will be granted once symptoms improved.    Emely MOE, CNP      This note was created with the help of Dragon dictation system. All grammatical/typing errors or context distortion are unintentional and inherent to software.

## 2021-08-19 NOTE — PROGRESS NOTES
This evening writer was alerted by psych associate that pt had refused to give back the TV remote and pts are not to be in possession of the remotes. Approached [place] in the [facility] and requested to have the TV remote back and the pt hesitantly gave the remote back stating that they were switching between his show and another pts show during commercial breaks. The other pt wanting to watch their television show made a statement about how this pt was controlling the TV and they just wanted to watch their show. The pt then started to argue with the other pt calling her a liar. While writer was attempting to explain the rules, the pt continued to talk over writer not allowing writer to speak. At this time, writer decided that the TV would remain off until after group at 2000 and then at that time the pts would all agree on what they wished to watch. A while after this, the pt approached a different psych associate requesting the remote to watch television stating that they had permission to watch television. The psych associate checked with writer and they were advised that the television was off until 2000. While writer was in the [facility] later, pt asked if the TV could be turned on. Advised pt that the TV was to remain off until 2000 as previously discussed. The pt became upset stating that writer was withholding the television, he then stated that he wanted a grievance. Advised pt that we did not have a grievance form but writer would provide him with patient relations phone number. Pt requested writers first and last name with phone number, pt was given writers first name and last initial.

## 2021-08-19 NOTE — PLAN OF CARE
Assessment/Intervention/Current Symptoms and Care Coordination  The patient's care was discussed with the treatment team and chart notes were reviewed.       Patient has an Interview scheduled with Will from [facility] intake department on Monday August 23rd, at 1 pm. Prince will call the unit and ask to speak with the patient. Will's direct number is [phone]. Patient's is current 3rd on their waiting list.      Discharge Plan or Goal  Discharge to an TS facility.      Barriers to Discharge   Needs further psychiatric evaluation, stabilization and discharge planning. Patient is currently endorsing symptoms of hallucinations, flashbacks, suicidal thoughts, depression (rates severity level at 9/10) and anxiety (rates severity level at 9/10).      Referral Status  CTC Sent referrals to the following [facility] facilities.     [facility] Central intake: Fax: [phone]. Phone number: [phone]   8/18---Called central line and was told that they received the referral. They will review the clinicals and call writer when they make a decision.      [employer] (Ph: [phone]; Central Access team at [phone], Central Intake Fax: [phone], Email: [email or web address].     8/18- CTC Left a voicemail and requested a call back with updates regarding this referral.      [facility]- Referral was sent to [email or web address]   8/18- Received the referral. Currently reviewing. Waiting period is 2-4 weeks long.      Legal Status  Vol

## 2021-08-19 NOTE — PROGRESS NOTES
"Internal Medicine Progress Note      Assessment and plan:  Briefly, Jonathon Do is a 56 year old male with PMH of PTSD, polysubstance use, MDD with psychotic features admitted 8/12 for decompensated psychiatric illness. Medicine following up on right hip pain.     Right hip pain  S/P traumatic jump per patient report ~6 weeks ago. XR at [facility] 07/31/2021 w/ no acute osseous injury to pelvis or hips, no sign of dislocation, stable moderate right and mild left hip primary OA. CT pelvis 07/07/2021 w/ degenerative changes in spine and hips, no acute fracture. Pain w/ ambulation and turning in bed. Repeat hip XR 8/14 with right hip moderate joint space narrowing, subchondral lucency in the right acetabular roof likely represents prominent subchondral cyst. No evidence of fracture. Right hip pain improved. Patient notes tightness of right adductor muscles.   -Tylenol and ibuprofen PRN  -Lidoderm patches   -PT consult   -Continued follow up with PCP on discharge     Currently patient is medically stable and medicine will sign off. Thank you for allowing us to be a part of this patients care. Please notify on call [facility] if any intercurrent medical issues arise.       Objective:  /81   Pulse 84   Temp (!) 96.7  F (35.9  C) (Temporal)   Resp 16   Ht 1.753 m (5' 9\")   Wt 106.1 kg (234 lb)   SpO2 99%   BMI 34.56 kg/m      Vitals signs reviewed and noted    GENERAL: Alert and oriented x 3. NAD.   HEENT: Anicteric sclera. Mucous membranes moist.   RESPIRATORY: Effort normal on RA.   NEUROLOGICAL: No focal deficits. Moves all extremities.    EXTREMITIES: No peripheral edema. Intact bilateral pedal pulses. Right hip abduction limited by right inner thigh tightness. Sensation intact.   SKIN: No jaundice. No rashes.     Pertinent labs and procedures were reviewed.     Subjective:   Follow up on right hip pain. Patient notes lateral right hip pain has improved. He now notes tightness of his right adductor muscles. Able to " walk without difficulties. Right hip abduction limited due to inner thigh tightness. Denies fevers, chills, nausea, vomiting, chest pain, dyspnea, lower extremity weakness or numbness.     Domitila Horta PA-C  Internal Medicine  [phone]

## 2021-08-20 LAB — GLUCOSE BLDC GLUCOMTR-MCNC: 242 MG/DL (ref 70–99)

## 2021-08-20 PROCEDURE — 250N000013 HC RX MED GY IP 250 OP 250 PS 637: Performed by: PHYSICIAN ASSISTANT

## 2021-08-20 PROCEDURE — 250N000013 HC RX MED GY IP 250 OP 250 PS 637: Performed by: NURSE PRACTITIONER

## 2021-08-20 PROCEDURE — 124N000003 HC R&B MH SENIOR/ADOLESCENT

## 2021-08-20 PROCEDURE — 90853 GROUP PSYCHOTHERAPY: CPT

## 2021-08-20 PROCEDURE — 99232 SBSQ HOSP IP/OBS MODERATE 35: CPT | Performed by: NURSE PRACTITIONER

## 2021-08-20 PROCEDURE — 250N000013 HC RX MED GY IP 250 OP 250 PS 637: Performed by: EMERGENCY MEDICINE

## 2021-08-20 PROCEDURE — H2032 ACTIVITY THERAPY, PER 15 MIN: HCPCS

## 2021-08-20 RX ORDER — QUETIAPINE FUMARATE 25 MG/1
25 TABLET, FILM COATED ORAL 2 TIMES DAILY
Qty: 60 TABLET | Refills: 0 | Status: SHIPPED | OUTPATIENT
Start: 2021-08-21 | End: 2021-09-20

## 2021-08-20 RX ORDER — LIDOCAINE 4 G/G
1-3 PATCH TOPICAL EVERY 24 HOURS
Qty: 90 PATCH | Refills: 0 | Status: SHIPPED | OUTPATIENT
Start: 2021-08-20 | End: 2021-09-19

## 2021-08-20 RX ORDER — OLANZAPINE 10 MG/1
10 TABLET ORAL 3 TIMES DAILY PRN
Qty: 60 TABLET | Refills: 0 | Status: SHIPPED | OUTPATIENT
Start: 2021-08-20 | End: 2021-09-19

## 2021-08-20 RX ORDER — QUETIAPINE FUMARATE 200 MG/1
200 TABLET, FILM COATED ORAL AT BEDTIME
Qty: 30 TABLET | Refills: 0 | Status: SHIPPED | OUTPATIENT
Start: 2021-08-20 | End: 2021-09-19

## 2021-08-20 RX ORDER — ASPIRIN 81 MG/1
81 TABLET, CHEWABLE ORAL DAILY
Qty: 30 TABLET | Refills: 0 | Status: SHIPPED | OUTPATIENT
Start: 2021-08-20 | End: 2021-09-19

## 2021-08-20 RX ORDER — DULOXETIN HYDROCHLORIDE 60 MG/1
60 CAPSULE, DELAYED RELEASE ORAL EVERY MORNING
Qty: 30 CAPSULE | Refills: 0 | Status: SHIPPED | OUTPATIENT
Start: 2021-08-21 | End: 2021-09-20

## 2021-08-20 RX ORDER — ATORVASTATIN CALCIUM 20 MG/1
20 TABLET, FILM COATED ORAL DAILY
Qty: 30 TABLET | Refills: 0 | Status: SHIPPED | OUTPATIENT
Start: 2021-08-20 | End: 2021-09-19

## 2021-08-20 RX ORDER — TRAZODONE HYDROCHLORIDE 50 MG/1
50 TABLET, FILM COATED ORAL
Qty: 30 TABLET | Refills: 0 | Status: SHIPPED | OUTPATIENT
Start: 2021-08-20 | End: 2021-09-19

## 2021-08-20 RX ORDER — GABAPENTIN 600 MG/1
600 TABLET ORAL 2 TIMES DAILY
Qty: 60 TABLET | Refills: 0 | Status: SHIPPED | OUTPATIENT
Start: 2021-08-20 | End: 2021-09-19

## 2021-08-20 RX ORDER — IBUPROFEN 600 MG/1
600 TABLET, FILM COATED ORAL EVERY 6 HOURS PRN
Qty: 90 TABLET | Refills: 0 | Status: SHIPPED | OUTPATIENT
Start: 2021-08-20 | End: 2021-09-19

## 2021-08-20 RX ADMIN — ASPIRIN 81 MG CHEWABLE TABLET 81 MG: 81 TABLET CHEWABLE at 08:29

## 2021-08-20 RX ADMIN — ATORVASTATIN CALCIUM 20 MG: 20 TABLET, FILM COATED ORAL at 08:29

## 2021-08-20 RX ADMIN — QUETIAPINE FUMARATE 25 MG: 25 TABLET ORAL at 13:05

## 2021-08-20 RX ADMIN — GABAPENTIN 600 MG: 600 TABLET, FILM COATED ORAL at 21:30

## 2021-08-20 RX ADMIN — IBUPROFEN 600 MG: 200 TABLET, FILM COATED ORAL at 08:29

## 2021-08-20 RX ADMIN — DULOXETINE HYDROCHLORIDE 60 MG: 60 CAPSULE, DELAYED RELEASE ORAL at 08:29

## 2021-08-20 RX ADMIN — QUETIAPINE FUMARATE 25 MG: 25 TABLET ORAL at 08:30

## 2021-08-20 RX ADMIN — QUETIAPINE 200 MG: 200 TABLET, FILM COATED ORAL at 21:30

## 2021-08-20 RX ADMIN — IBUPROFEN 600 MG: 200 TABLET, FILM COATED ORAL at 14:41

## 2021-08-20 RX ADMIN — GABAPENTIN 600 MG: 600 TABLET, FILM COATED ORAL at 08:29

## 2021-08-20 ASSESSMENT — ACTIVITIES OF DAILY LIVING (ADL)
HYGIENE/GROOMING: INDEPENDENT
ORAL_HYGIENE: INDEPENDENT
DRESS: INDEPENDENT

## 2021-08-20 NOTE — PROGRESS NOTES
Patient was asleep at the start of the shift and woke up @ 0300. He was incontinent of urine and requested for a set of fresh and new scrubs and bed linen. He was calm and pleasant, came out to the lounge and had some yogurt. Stayed and sat in the lounge for a while and asked what time it was.     Slept a total of 6.5 hours.

## 2021-08-20 NOTE — PROGRESS NOTES
[facility], [place]   Psychiatric Progress Note        Interim History:   From H&P: Hi Do is a 56 year old male with PMH significant for PTSD, MDD with psychotic features, and polysubstance abuse who presents to the Emergency Department for evaluation of suicidal ideation.  Patient was found by the police on a bridge with the intention of jumping off to kill himself.  Patient reports auditory and visual hallucinations that are telling him he is worthless and that he needs to kill himself.  He also indicates he sees very disturbing, graphic images.  Patient has a hip problem from jumping off a third floor balcony 7 weeks ago also in a suicide attempt.  Patient was recently hospitalized and set up with a bed in a [facility] facility, but he did not go to this and went back to his apartment.  He also did not follow-up with the outpatient appointments scheduled for him to establish psychiatrist and therapist.  He then eventually stopped taking his medications and the symptoms got worse.  Patient has a history of substance abuse, but denies recent use.  Patient reports that he was raised in foster care since 9 months old.  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  He was  and had kids.  Patient reports that he has not worked since 2011 and has been on government assistance.  He states that he is currently homeless.    Team meeting report: The patient's care was discussed with the treatment team during the daily team meeting and/or staff's chart notes were reviewed.  Staff report patient has been calm, pleasant, cooperative. He does get easily frustrated by not being able to control whats on tv the patient was demanding. He refused to give up the remote control on several occasions. Engaged in staff splitting. Patient  attended one group. Patient is eating well and taking medications as prescribed. Reports AH. Depression and anxiety are better. Slept all night.     Met with patient and CCT [place].  Discussed in length for his behavior yesterday and expectations on the unit.  The patient became quite defensive stating that he has been discriminated against.  He has been refusing to attend some of the groups stating that the person who is running the groups does not like him and is demeaning to him.  Encourage patient to attend other groups as this is a requirement for IRTS facilities.  Encouraged patient to be respectful to staff and peers. The patient is aware that we have made multiple referrals however, all of the places have a long waiting close.  He might discharged early next week to a shelter where he will be waiting to get into IRTS.  Patient argues that he does not have a phone or transportation to follow-up however, is agreeable with the plan.  Will order the medications today.  Tentative discharge early next week to a shelter if she does not go to IRTS. Meds have been ordered.          Medications:       aspirin  81 mg Oral Daily     atorvastatin  20 mg Oral Daily     DULoxetine  60 mg Oral QAM     gabapentin  600 mg Oral BID     lidocaine  1-3 patch Transdermal Q24H     lidocaine   Transdermal Q8H     QUEtiapine  200 mg Oral At Bedtime     QUEtiapine  25 mg Oral BID          Allergies:   No Known Allergies       Labs:     Recent Results (from the past 672 hour(s))   COVID-19 VIRUS (CORONAVIRUS) BY PCR (EXTERNAL RESULT)    Collection Time: 07/31/21  4:42 PM   Result Value Ref Range    COVID-19 Virus by PCR (External Result) Negative Negative   Drug abuse screen 1 urine (ED)    Collection Time: 08/13/21 12:11 AM   Result Value Ref Range    Amphetamines Urine Screen Negative Screen Negative    Barbiturates Urine Screen Negative Screen Negative    Benzodiazepines Urine Screen Negative Screen Negative    Cannabinoids Urine  Screen Negative Screen Negative    Cocaine Urine Screen Positive (A) Screen Negative    Opiates Urine Screen Negative Screen Negative   Comprehensive metabolic panel    Collection Time: 08/13/21  2:09 AM   Result Value Ref Range    Sodium 136 133 - 144 mmol/L    Potassium 4.3 3.4 - 5.3 mmol/L    Chloride 102 94 - 109 mmol/L    Carbon Dioxide (CO2) 29 20 - 32 mmol/L    Anion Gap 5 3 - 14 mmol/L    Urea Nitrogen 29 7 - 30 mg/dL    Creatinine 1.14 0.66 - 1.25 mg/dL    Calcium 9.0 8.5 - 10.1 mg/dL    Glucose 191 (H) 70 - 99 mg/dL    Alkaline Phosphatase 101 40 - 150 U/L    AST 27 0 - 45 U/L    ALT 37 0 - 70 U/L    Protein Total 7.4 6.8 - 8.8 g/dL    Albumin 3.4 3.4 - 5.0 g/dL    Bilirubin Total 0.7 0.2 - 1.3 mg/dL    GFR Estimate 70 >60 mL/min/1.73m2   CBC with platelets and differential    Collection Time: 08/13/21  2:09 AM   Result Value Ref Range    WBC Count 7.1 4.0 - 11.0 10e3/uL    RBC Count 4.67 4.40 - 5.90 10e6/uL    Hemoglobin 13.4 13.3 - 17.7 g/dL    Hematocrit 39.8 (L) 40.0 - 53.0 %    MCV 85 78 - 100 fL    MCH 28.7 26.5 - 33.0 pg    MCHC 33.7 31.5 - 36.5 g/dL    RDW 12.8 10.0 - 15.0 %    Platelet Count 118 (L) 150 - 450 10e3/uL    % Neutrophils 58 %    % Lymphocytes 27 %    % Monocytes 10 %    % Eosinophils 4 %    % Basophils 1 %    % Immature Granulocytes 0 %    NRBCs per 100 WBC 0 <1 /100    Absolute Neutrophils 4.0 1.6 - 8.3 10e3/uL    Absolute Lymphocytes 1.9 0.8 - 5.3 10e3/uL    Absolute Monocytes 0.7 0.0 - 1.3 10e3/uL    Absolute Eosinophils 0.3 0.0 - 0.7 10e3/uL    Absolute Basophils 0.1 0.0 - 0.2 10e3/uL    Absolute Immature Granulocytes 0.0 <=0.0 10e3/uL    Absolute NRBCs 0.0 10e3/uL   SARS-COV2 (COVID-19) Virus RT-PCR    Collection Time: 08/13/21  2:10 AM    Specimen: Nasopharyngeal; Swab   Result Value Ref Range    SARS CoV2 PCR Negative Negative   Glucose by meter    Collection Time: 08/13/21  2:27 AM   Result Value Ref Range    GLUCOSE BY METER POCT 182 (H) 70 - 99 mg/dL   Glucose by meter     Collection Time: 08/20/21 12:20 PM   Result Value Ref Range    GLUCOSE BY METER POCT 242 (H) 70 - 99 mg/dL            Psychiatric Examination:   Temp: 96.8  F (36  C) Temp src: Oral BP: 110/72 Pulse: 71     SpO2: 97 % O2 Device: None (Room air)    Weight is 234 lbs 0 oz  Body mass index is 34.56 kg/m .    Appearance: awake, cooperative, no apparent distress and mildly obese  Attitude:  cooperative  Eye Contact:  good  Mood:  anxious and depressed  Affect:  intensity is flat  Speech:  mumbling  Psychomotor Behavior:  no evidence of tardive dyskinesia, dystonia, or tics  Throught Process:  logical and goal oriented  Associations:  no loose associations  Thought Content:  no evidence of suicidal ideation or homicidal ideation, auditory hallucinations present and visual hallucinations present  Insight:  good  Judgement:  intact  Oriented to:  time, person, and place  Attention Span and Concentration:  intact  Recent and Remote Memory:  intact         Precautions:     Behavioral Orders   Procedures     Code 2     Discontinue 1:1 attendant for suicide risk     Order Specific Question:   I have performed an in person assessment of the patient     Answer:   Based on this assessment the patient no longer requires a one on one attendant at this point in time.     Order Specific Question:   Rationale     Answer:   Patient States able to remain safe in hospital     Fall precautions     Routine Programming     As clinically indicated     Status 15     Every 15 minutes.          DIagnoses:   1.  Major depressive disorder, recurrent, severe, with anxious distress and psychosis  2.  Generalized anxiety disorder  3.  PTSD  4.  Cocaine use disorder  5.  Malingering is suspected         Plan:   Medications will include the following:  --Cymbalta 60 mg every morning  --Gabapentin 600 mg twice a day  --Seroquel 200 mg at bedtime  --Seroquel 25 mg twice a day  --As needed medications will include hydroxyzine, Zyprexa, and  trazodone    --Blood work was reviewed  --Internal medicine follow-up for medical problems       Disposition Plan   Reason for ongoing admission: is unable to care for self due to depression, psychosis  Disposition: TBD, likely IRTS  Estimated length of stay: 5-7 days  Legal Status:  voluntary  Discharge will be granted once symptoms improved.    Emely MOE, CNP      This note was created with the help of Dragon dictation system. All grammatical/typing errors or context distortion are unintentional and inherent to software.    More than 35 minutes were spent for assessment, documentation, counseling and  coordination of care.

## 2021-08-20 NOTE — PLAN OF CARE
Assessment/Intervention/Current Symptoms and Care Coordination  The patient's care was discussed with the treatment team and chart notes were reviewed.       Patient has an Interview scheduled with Will from [facility] intake department on Monday August 23rd, at 1 pm. Prince will call the unit and ask to speak with the patient. Will's direct number is [phone]. Patient's is currently 3rd on their waiting list.     Discharge Plan or Goal  Discharge to an IRTS facility.      Barriers to Discharge   Needs further psychiatric evaluation, stabilization and discharge planning. Patient is currently endorsing symptoms of hallucinations, flashbacks, suicidal thoughts, depression (rates severity level at 9/10) and anxiety (rates severity level at 9/10).      Referral Status  CTC Sent referrals to the following TS facilities.     [facility] Central intake: Fax: [phone]. Phone number: [phone]   8/18---Called central line and was told that they received the referral. They will review the clinicals and call writer when they make a decision.      [employer] (Ph: [phone]; Central Access team at [phone], Central Intake Fax: [phone], Email: [email or web address].     8/18- CTC Left a voicemail and requested a call back with updates regarding this referral.      [facility]- Referral was sent to [email or web address]   8/18- Received the referral. Currently reviewing. Waiting period is 2-4 weeks long.      8/20 sent a new referrals to [facility] ([phone]) and [facility] TS program ([phone])      Legal Status  Vol

## 2021-08-20 NOTE — PLAN OF CARE
"  Problem: Depression  Goal: Improved Mood  Outcome: No Change     Problem: Anxiety  Goal: Anxiety Reduction or Resolution  Outcome: No Change     Problem: Sleep Disturbance  Goal: Adequate Sleep/Rest  Outcome: No Change   Pt rated anxiety and depression at 6/10. Pt denies current active SI/SIB but stated that the thoughts could be \"spontaneous\". Pt does admit to wishes to be dead. Pt denies current auditory hallucinations but reports that he did have them last night.   Pt affect brightens during interactions and has appropriate eye contact. Pt reports interrupted sleep due to \"the flash light in my eyes\". Pt also noted to have a episode of urinary incontinence during the night. Pt reported that this is new for him.  Pt has been eating well at meal time.  Pt spends free time in the lounge watching TV.   Pt continues to complain of right hip pain. Pt requested/received prn ibuprofen 600 mg with AM medications. Pt is also using a wheeled walker this AM.     "

## 2021-08-20 NOTE — PLAN OF CARE
"  Problem: OT General Care Plan  Goal: OT Goal 1  Description: Will consistently attend OT groups and improve coping strategies with increasing repertoire of ideas and understanding of symptoms of when to use the strategies.       Note: On approach for an invitation to OT groups, pt stated he would not ever attend this author's groups as he did not \"like\" the author. He stated being unable to explain though thought there was something said that he did not like. He was offered to talk about what he may have been upset about though he said there was nothing else to say. Just to note, the interactions with inviting pt to group and he has daily declined, the exchanges have been in him being asked to turn off the TV  being group time, and collecting the remote which he has always returned to this author. An hour later, hpt asked to speak with this author and explained he thought his \"voices\" interfered with the interactions in the past and wanted to explain that. He also stated plans to begin attending groups and looks forward to working with this OT author.     "

## 2021-08-21 PROCEDURE — 250N000013 HC RX MED GY IP 250 OP 250 PS 637: Performed by: PHYSICIAN ASSISTANT

## 2021-08-21 PROCEDURE — 250N000013 HC RX MED GY IP 250 OP 250 PS 637: Performed by: EMERGENCY MEDICINE

## 2021-08-21 PROCEDURE — 124N000003 HC R&B MH SENIOR/ADOLESCENT

## 2021-08-21 PROCEDURE — 90853 GROUP PSYCHOTHERAPY: CPT

## 2021-08-21 PROCEDURE — 250N000013 HC RX MED GY IP 250 OP 250 PS 637: Performed by: NURSE PRACTITIONER

## 2021-08-21 RX ADMIN — DULOXETINE HYDROCHLORIDE 60 MG: 60 CAPSULE, DELAYED RELEASE ORAL at 08:13

## 2021-08-21 RX ADMIN — GABAPENTIN 600 MG: 600 TABLET, FILM COATED ORAL at 20:55

## 2021-08-21 RX ADMIN — ATORVASTATIN CALCIUM 20 MG: 20 TABLET, FILM COATED ORAL at 08:13

## 2021-08-21 RX ADMIN — ASPIRIN 81 MG CHEWABLE TABLET 81 MG: 81 TABLET CHEWABLE at 08:13

## 2021-08-21 RX ADMIN — QUETIAPINE FUMARATE 25 MG: 25 TABLET ORAL at 13:29

## 2021-08-21 RX ADMIN — QUETIAPINE FUMARATE 25 MG: 25 TABLET ORAL at 08:13

## 2021-08-21 RX ADMIN — GABAPENTIN 600 MG: 600 TABLET, FILM COATED ORAL at 08:13

## 2021-08-21 RX ADMIN — QUETIAPINE 200 MG: 200 TABLET, FILM COATED ORAL at 20:56

## 2021-08-21 RX ADMIN — IBUPROFEN 600 MG: 200 TABLET, FILM COATED ORAL at 20:55

## 2021-08-21 ASSESSMENT — ACTIVITIES OF DAILY LIVING (ADL)
DRESS: INDEPENDENT;SCRUBS (BEHAVIORAL HEALTH)
ORAL_HYGIENE: INDEPENDENT
HYGIENE/GROOMING: INDEPENDENT

## 2021-08-21 NOTE — PLAN OF CARE
"  Problem: Depression  Goal: Improved Mood  Outcome: No Change     Problem: Anxiety  Goal: Anxiety Reduction or Resolution  Outcome: No Change     Problem: Sleep Disturbance  Goal: Adequate Sleep/Rest  Outcome: Improving     Problem: Suicidal Behavior  Goal: Suicidal Behavior is Absent or Managed  Outcome: Improving  Flowsheets (Taken 8/21/2021 2576)  Mutually Determined Action Steps (Suicidal Behavior Absent/Managed):    sets future-oriented goal    shares suicidal thoughts    Pt reports that he slept better last night. Pt rated anxiety and depression at 7/10 (where 10 is the worst). Pt reports that auditory hallucinations (\"voices\") are improving (\"I didn't have any last night\"). Pt feels that his mood his improved since admission but that he is not at baseline. Pt continues to report passive SI with wishes to be dead.   Pt's affect is flat with appropriate eye contact.   Pt declined lidocaine patch this AM. Pt reports that his hip pain is improving but that he has some pain in his lateral/anterior upper leg. Pt declined ibuprofen for this during medication pass.    Pt has been visible in common area's for most of the shift. Pt appetite has been good and patient has eaten well at both meals.        "

## 2021-08-21 NOTE — PLAN OF CARE
Pt visible in milieu. Affect bright, eye contact appropriate. Social with peers. Attended and participated in therapeutic group. Continues to report moderate symptoms of depression/anxiety and intermittent passive SI. No c/o hallucinations, does not appear internally preoccupied. Med-compliant. Cooperative, pleasant. Behavior appropriate in milieu and with staff. Continue with current treatment plan and recommendations. Continue to monitor and reassess symptoms. Monitor response to medications. Monitor progress towards treatment goals. Encourage groups and participation.

## 2021-08-21 NOTE — PLAN OF CARE
Music Therapy Group note    Clinical Hours in session: 1.0    Number of patients in group: 6    Scope of service: psychodynamic     Patient progress: initial encounter    Intervention: Music Psychotherapy     Goal of group: to gain insight     Patient response/reaction to treatment intervention(s): Hi shared a song about relationships and talked about what it meant to him.  He at times attempted to guide the course of group process, but was redirectable.  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  Expressed great appreciation for group and did not want it to end.     Janette Ramirez, [facility]  Board-Certified Music Therapist            76

## 2021-08-22 PROCEDURE — 250N000013 HC RX MED GY IP 250 OP 250 PS 637: Performed by: EMERGENCY MEDICINE

## 2021-08-22 PROCEDURE — 90853 GROUP PSYCHOTHERAPY: CPT

## 2021-08-22 PROCEDURE — 124N000003 HC R&B MH SENIOR/ADOLESCENT

## 2021-08-22 PROCEDURE — 250N000013 HC RX MED GY IP 250 OP 250 PS 637: Performed by: PHYSICIAN ASSISTANT

## 2021-08-22 PROCEDURE — 250N000013 HC RX MED GY IP 250 OP 250 PS 637: Performed by: NURSE PRACTITIONER

## 2021-08-22 RX ADMIN — ATORVASTATIN CALCIUM 20 MG: 20 TABLET, FILM COATED ORAL at 07:57

## 2021-08-22 RX ADMIN — QUETIAPINE 200 MG: 200 TABLET, FILM COATED ORAL at 22:33

## 2021-08-22 RX ADMIN — DULOXETINE HYDROCHLORIDE 60 MG: 60 CAPSULE, DELAYED RELEASE ORAL at 07:57

## 2021-08-22 RX ADMIN — QUETIAPINE FUMARATE 25 MG: 25 TABLET ORAL at 14:38

## 2021-08-22 RX ADMIN — ASPIRIN 81 MG CHEWABLE TABLET 81 MG: 81 TABLET CHEWABLE at 07:57

## 2021-08-22 RX ADMIN — GABAPENTIN 600 MG: 600 TABLET, FILM COATED ORAL at 22:34

## 2021-08-22 RX ADMIN — QUETIAPINE FUMARATE 25 MG: 25 TABLET ORAL at 07:57

## 2021-08-22 RX ADMIN — IBUPROFEN 600 MG: 200 TABLET, FILM COATED ORAL at 14:38

## 2021-08-22 RX ADMIN — GABAPENTIN 600 MG: 600 TABLET, FILM COATED ORAL at 07:57

## 2021-08-22 ASSESSMENT — ACTIVITIES OF DAILY LIVING (ADL)
ORAL_HYGIENE: INDEPENDENT
HYGIENE/GROOMING: INDEPENDENT
DRESS: INDEPENDENT

## 2021-08-22 NOTE — PLAN OF CARE
Problem: Suicidal Behavior  Goal: Suicidal Behavior is Absent or Managed  Outcome: Improving  Flowsheets (Taken 8/22/2021 0801)  Mutually Determined Action Steps (Suicidal Behavior Absent/Managed):    sets future-oriented goal    shares suicidal thoughts     Problem: Depression  Goal: Improved Mood  Outcome: Improving     Problem: Anxiety  Goal: Anxiety Reduction or Resolution  Outcome: Improving    Pt declined to have BG checked this AM.   Pt declined to have Lidoderm patch this.    Pt has been visible in common [facility]'s and has attended programming this shift. Pt brightens during interactions. Pt watched movies with peers this AM and played the host in a family feud game this afternoon. Pt is animated during interactions.   Pt has been walking without a walker today.   Pt has been eating well.

## 2021-08-22 NOTE — PLAN OF CARE
Problem: Sleep Disturbance  Goal: Adequate Sleep/Rest  Outcome: Improving   Patient has remained in his room sleeping for the entire shift.  No PRNs administered.  Patient slept for 7 hrs.

## 2021-08-22 NOTE — PROGRESS NOTES
" 08/21/21 2300   Groups   Details    (Psychotherapy)   Number of patients attending the group:  6  Group Length:  1 Hours     Group Therapy Type: Psychotherapy     Summary of Group / Topics Discussed:        The  Psychotherapy group goal is to promote insight to positive choice and change. Group processing is within a supportive and safe environment. Patients will process emotions using verbal group and expressive psychotherapy interventions including visual art/writing interventions.     Group interventions support patients by: cultivating resilience, self efficacy/empowerment and hope/optimism     Modalities to reach these goals include: positive/solution focused psychology  and Expressive Arts Therapies     Subjective -patient report of mood today-\"great , mellow day\"     Objective/ Intervention- Goal of group and Therapeutic modality utilized- watercolor symbol of strength and discussion about strength and resilience     Group Response- engaged large group     Patient Response- Pt was pleasant, cooperative and engaged. He seemed a bit irritable at the beginning of group when writer commented when a peer escorted another peer to [facility] that we could get staff to help. He said something like \" you seem agitated\" to writer, which was not the case . He was insightful in group conversation and painting throughout group. He expressed he is usually putting up a front as the strong one. His children do not know he is hospitalized he reports. At the end of group , a male peer who he is very friendly with had a fall. He was very concerned. At first instinctively, he was going to try to help lift him. Writer asked him not to touch peer and writer summoned nursing help and writer expressed appreciation to him for sitting near the pt that fell while nurses were called in. The peer was fine, minor scrapes, but Hi remained very concerned about him.     Gene Nielsen, TINA, ATR-BC         "

## 2021-08-23 LAB — SARS-COV-2 RNA RESP QL NAA+PROBE: NEGATIVE

## 2021-08-23 PROCEDURE — 87635 SARS-COV-2 COVID-19 AMP PRB: CPT | Performed by: PSYCHIATRY & NEUROLOGY

## 2021-08-23 PROCEDURE — 90853 GROUP PSYCHOTHERAPY: CPT

## 2021-08-23 PROCEDURE — 250N000013 HC RX MED GY IP 250 OP 250 PS 637: Performed by: NURSE PRACTITIONER

## 2021-08-23 PROCEDURE — G0177 OPPS/PHP; TRAIN & EDUC SERV: HCPCS

## 2021-08-23 PROCEDURE — 99232 SBSQ HOSP IP/OBS MODERATE 35: CPT | Performed by: PSYCHIATRY & NEUROLOGY

## 2021-08-23 PROCEDURE — 124N000003 HC R&B MH SENIOR/ADOLESCENT

## 2021-08-23 PROCEDURE — 250N000013 HC RX MED GY IP 250 OP 250 PS 637: Performed by: PHYSICIAN ASSISTANT

## 2021-08-23 PROCEDURE — 250N000013 HC RX MED GY IP 250 OP 250 PS 637: Performed by: EMERGENCY MEDICINE

## 2021-08-23 RX ADMIN — QUETIAPINE 200 MG: 200 TABLET, FILM COATED ORAL at 21:33

## 2021-08-23 RX ADMIN — GABAPENTIN 600 MG: 600 TABLET, FILM COATED ORAL at 08:14

## 2021-08-23 RX ADMIN — DULOXETINE HYDROCHLORIDE 60 MG: 60 CAPSULE, DELAYED RELEASE ORAL at 08:14

## 2021-08-23 RX ADMIN — ASPIRIN 81 MG CHEWABLE TABLET 81 MG: 81 TABLET CHEWABLE at 08:15

## 2021-08-23 RX ADMIN — IBUPROFEN 600 MG: 200 TABLET, FILM COATED ORAL at 18:35

## 2021-08-23 RX ADMIN — QUETIAPINE FUMARATE 25 MG: 25 TABLET ORAL at 08:14

## 2021-08-23 RX ADMIN — IBUPROFEN 600 MG: 200 TABLET, FILM COATED ORAL at 08:15

## 2021-08-23 RX ADMIN — QUETIAPINE FUMARATE 25 MG: 25 TABLET ORAL at 14:31

## 2021-08-23 RX ADMIN — ATORVASTATIN CALCIUM 20 MG: 20 TABLET, FILM COATED ORAL at 08:14

## 2021-08-23 RX ADMIN — GABAPENTIN 600 MG: 600 TABLET, FILM COATED ORAL at 21:33

## 2021-08-23 ASSESSMENT — ACTIVITIES OF DAILY LIVING (ADL)
DRESS: INDEPENDENT
ORAL_HYGIENE: INDEPENDENT
ORAL_HYGIENE: INDEPENDENT
HYGIENE/GROOMING: INDEPENDENT
LAUNDRY: WITH SUPERVISION
DRESS: INDEPENDENT
HYGIENE/GROOMING: INDEPENDENT

## 2021-08-23 NOTE — PLAN OF CARE
Problem: Depression  Goal: Improved Mood  Outcome: Improving     Problem: Anxiety  Goal: Anxiety Reduction or Resolution  Outcome: Improving     Problem: Suicidal Behavior  Goal: Suicidal Behavior is Absent or Managed  Outcome: Improving  Flowsheets (Taken 8/23/2021 1239)  Mutually Determined Action Steps (Suicidal Behavior Absent/Managed): sets future-oriented goal    Pt reports that he is feeling more hopeful. Pt rated anxiety and depression at 2/10 (where 10 is the worst). Pt denies SI/SIB and denies voices today. Pt does report that he had been having some voices at night. Pt's affect brightens during interactions with appropriate eye contact.   Pt has been attending programming and is social with peers.  Pt verbalized hip pain  early in the shift and was offered/accepted prn ibuprofen 600 at 0815. Pt reports that this was effective in decreasing his pain.   Pt will be having phone interview with [facility] at 1300 today.

## 2021-08-23 NOTE — PLAN OF CARE
Pt visible in milieu. Actively conducting game with group of peers or watching movies. Eye contact good. Affect WDL. No complaints or evidence of psychosis. Continues to report symptoms of depression, but denies anxiety and SI. Feels hopeful IRTS interview will go well tomorrow and is hopeful he will be able to discharge in the next few days. Pleasant, cooperative, med-compliant. Behavior appropriate. Continue with current treatment plan and recommendations. Continue to monitor and reassess symptoms. Monitor response to medications. Monitor progress towards treatment goals. Encourage groups and participation.

## 2021-08-23 NOTE — PROGRESS NOTES
"[facility], [place]   Psychiatric Progress Note        Interim History:   The patient's care was discussed with the treatment team during the daily team meeting and/or staff's chart notes were reviewed.  Staff report patient has been doing better. Was engaged with staff and peers all weekend.     The patient reports that he is \"fair.\" Says that he had a good weekend and attended all the groups. Still has some hip pain but ibuprofen is helping. Sleeping better. Denies SI or HI. Still hearing voices but says that he hasn't had any visual hallucinations \"for the past three days.\" Has IRTS interview today and says that he may go to a crisis bed if there is a wait.          Medications:       aspirin  81 mg Oral Daily     atorvastatin  20 mg Oral Daily     DULoxetine  60 mg Oral QAM     gabapentin  600 mg Oral BID     lidocaine  1-3 patch Transdermal Q24H     lidocaine   Transdermal Q8H     QUEtiapine  200 mg Oral At Bedtime     QUEtiapine  25 mg Oral BID          Allergies:   No Known Allergies       Labs:   No results found for this or any previous visit (from the past 24 hour(s)).       Psychiatric Examination:     /82   Pulse 84   Temp 96.8  F (36  C) (Temporal)   Resp 16   Ht 1.753 m (5' 9\")   Wt 106.1 kg (234 lb)   SpO2 97%   BMI 34.56 kg/m    Weight is 234 lbs 0 oz  Body mass index is 34.56 kg/m .  Orthostatic Vitals       Most Recent      Sitting Orthostatic BP --  Comment: refused 08/22 0805    Standing Orthostatic /77 08/23 0832    Standing Orthostatic Pulse (bpm) 94 08/23 0832            Appearance: awake, alert and adequately groomed  Attitude:  cooperative  Eye Contact:  good  Mood:  better  Affect:  mood congruent  Speech:  clear, coherent  Psychomotor Behavior:  no evidence of tardive dyskinesia, dystonia, or tics  Thought Process:  goal oriented  Associations:  no loose associations  Thought Content:  no evidence of suicidal ideation or homicidal ideation " and auditory hallucinations present  Insight:  fair  Judgement:  fair  Oriented to:  time, person, and place  Attention Span and Concentration:  intact  Recent and Remote Memory:  fair    Clinical Global Impressions  First:  Considering your total clinical experience with this particular patient population, how severe are the patient's symptoms at this time?: 7 (08/16/21 0843)  Compared to the patient's condition at the START of treatment, this patient's condition is: 7 (08/16/21 0843)  Most recent:  Considering your total clinical experience with this particular patient population, how severe are the patient's symptoms at this time?: 7 (08/16/21 0843)  Compared to the patient's condition at the START of treatment, this patient's condition is: 7 (08/16/21 0843)         Precautions:     Behavioral Orders   Procedures     Code 2     Discontinue 1:1 attendant for suicide risk     Order Specific Question:   I have performed an in person assessment of the patient     Answer:   Based on this assessment the patient no longer requires a one on one attendant at this point in time.     Order Specific Question:   Rationale     Answer:   Patient States able to remain safe in hospital     Fall precautions     Routine Programming     As clinically indicated     Status 15     Every 15 minutes.          Diagnoses:     1.  Major depressive disorder, recurrent, severe, with anxious distress and psychosis  2.  Generalized anxiety disorder  3.  PTSD  4.  Cocaine use disorder            Plan:     Medications will include the following:  --Cymbalta 60 mg every morning  --Gabapentin 600 mg twice a day  --Seroquel 200 mg at bedtime  --Seroquel 25 mg twice a day    IRTS interview today. Patient may discharge to crisis bed or shelter for transition plan.       Disposition Plan   Reason for ongoing admission: is unable to care for self due to severe psychosis or jammie  Discharge location: IRTS facility  Discharge Medications: not  ordered  Follow-up Appointments: not scheduled  Legal Status: voluntary    Entered by: Solitario Tavera on 8/12/21 at 2:52 PM

## 2021-08-23 NOTE — PLAN OF CARE
Assessment/Intervention/Current Symtoms and Care Coordination:  Current Symptoms include the following: anxious  Chart Review  Met with team  Patient had phone interview with [employer] [facility] for IRTS.    Discharge Plan or Goal:  Pending stabilization & development of a safe discharge plan.  Considerations include: Intensive Residential Treatment Services (IRTS):      Barriers to Discharge:  Patient requires further psychiatric stabilization due to current symptomology and Medication management with possible adjustments    Referral Status:  No new referrals made today.    Legal Status:  Patient is voluntary

## 2021-08-23 NOTE — PLAN OF CARE
Number of patients attending the group:  5  Group Length:  1 Hours    Group Therapy     Summary of Group / Topics Discussed:    Discussed social support system. The group shared their struggles with this, identified having core support as a weakness for everyone in the group. Issues with developing trust and intimacy discussed. Also explored personal patterns of social relationships and how that has been problematic at times for each group member. The group also discussed how this can be impacted by introversion versus extroversion in addition to social anxiety.    The  Psychotherapy group goal is to promote insight to positive choice and change. Group processing is within a supportive and safe environment. Patients will process emotions using verbal group and expressive psychotherapy interventions.        Assessment: The group was active, explored various areas and shared openly regarding their struggles. The group members were supportive of one another; pleasant and cooperative.         Patient Response- Pt was active in group, stated that he tends to be extroverted and so has many acquaintances and friends but not a core group. Discussed his desire not to burden others but also to have someone to talk to. Pt plans to attend [facility] and hopes to gain support there. Pt was very supportive of other group members.

## 2021-08-23 NOTE — PROGRESS NOTES
"   08/23/21 1300   General Information   Date Initially Attended OT 08/23/21   Clinical Impression   Affect Appropriate to situation   Orientation Oriented to person, place and time   Appearance and ADLs Neatly groomed   Attention to Internal Stimuli No observed signs   Interaction Skills Initiates appropriately with staff;Initiates appropriately with peers   Ability to Communicate Needs Independent   Verbal Content Clear;Appropriate to topic;Articulate   Ability to Maintain Boundaries Maintains appropriate physical boundaries;Maintains appropriate verbal boundaries   Participation Initiates participation   Concentration Concentrates 50 minutes   Ability to Concentrate Without difficulty   Follows and Comprehends Directions Independently follows multi-step directions   Memory Delayed and immediate recall intact   Organization Independently organizes all tasks   Decision Making Independent   Planning and Problem Solving Independently plans ahead   Ability to Apply and Learn Concepts Applies within group structure   Frustrations / Stress Tolerance Independently identifies sources of frustration/stress;Needs further assessment   Level of Insight Insightful into needs, issues, goals;Needs further assessment   Self Esteem Can identify positives   Social Supports Unable to identify any supports   General Observation/Plan   General Observations/Plan See Comments   Attended 3 of 3 OT groups. He was pleasant and offered support to a peer in a erin manner. He worked independently for 50 minute with 8 pts in a goal oriented task group. He was successful in problem solving on a familiar task on the IPAD using visuospatial concepts. He stated reason for admission as \"for self care\". He stated his support to be \"poor\" and a personal strength he identified was \"working\". Changes he hopes for at time of discharge was \"my self care\". OT goals he wants to focus on was deal with frustration more effectively, increase motivation, time " management and ask for help as needed. He also participated for 50 minutes with 7 pts in an activity focused on Aftercare,  identiying support people, coping strategies, behaviors and red flags, affirmations and daily and weekly routines that are helpful with gaining balance. He offered insightful answers of coping ideas and also daily structure that is helpful for him to follow through with. He stated having structure to his day is helpful for him and he works to keep routines. In the 3rd and afternoon group, he took an active role in an activity requiring quick and creative thinking. He supported peers and offered multiple answers.  Plan: Provide structure, support, and encouragement through attendance to groups. Assist pt to increase self awareness regarding mental health issues and expand helpful coping strategies. Encourage participation in more complex tasks of interest to help build motivation and dealing with frustration more effectively.  Support all efforts of involvement.

## 2021-08-24 PROCEDURE — 250N000013 HC RX MED GY IP 250 OP 250 PS 637: Performed by: NURSE PRACTITIONER

## 2021-08-24 PROCEDURE — 250N000013 HC RX MED GY IP 250 OP 250 PS 637: Performed by: PHYSICIAN ASSISTANT

## 2021-08-24 PROCEDURE — G0177 OPPS/PHP; TRAIN & EDUC SERV: HCPCS

## 2021-08-24 PROCEDURE — 124N000003 HC R&B MH SENIOR/ADOLESCENT

## 2021-08-24 PROCEDURE — H2032 ACTIVITY THERAPY, PER 15 MIN: HCPCS

## 2021-08-24 PROCEDURE — 99232 SBSQ HOSP IP/OBS MODERATE 35: CPT | Performed by: PSYCHIATRY & NEUROLOGY

## 2021-08-24 PROCEDURE — 250N000013 HC RX MED GY IP 250 OP 250 PS 637: Performed by: EMERGENCY MEDICINE

## 2021-08-24 RX ADMIN — ATORVASTATIN CALCIUM 20 MG: 20 TABLET, FILM COATED ORAL at 08:23

## 2021-08-24 RX ADMIN — QUETIAPINE 200 MG: 200 TABLET, FILM COATED ORAL at 21:40

## 2021-08-24 RX ADMIN — QUETIAPINE FUMARATE 25 MG: 25 TABLET ORAL at 14:57

## 2021-08-24 RX ADMIN — IBUPROFEN 600 MG: 200 TABLET, FILM COATED ORAL at 21:42

## 2021-08-24 RX ADMIN — GABAPENTIN 600 MG: 600 TABLET, FILM COATED ORAL at 08:23

## 2021-08-24 RX ADMIN — GABAPENTIN 600 MG: 600 TABLET, FILM COATED ORAL at 21:40

## 2021-08-24 RX ADMIN — DULOXETINE HYDROCHLORIDE 60 MG: 60 CAPSULE, DELAYED RELEASE ORAL at 08:23

## 2021-08-24 RX ADMIN — QUETIAPINE FUMARATE 25 MG: 25 TABLET ORAL at 08:23

## 2021-08-24 RX ADMIN — ASPIRIN 81 MG CHEWABLE TABLET 81 MG: 81 TABLET CHEWABLE at 08:23

## 2021-08-24 ASSESSMENT — ACTIVITIES OF DAILY LIVING (ADL)
HYGIENE/GROOMING: INDEPENDENT
DRESS: INDEPENDENT;SCRUBS (BEHAVIORAL HEALTH)
ORAL_HYGIENE: INDEPENDENT

## 2021-08-24 ASSESSMENT — MIFFLIN-ST. JEOR: SCORE: 1869.53

## 2021-08-24 NOTE — PLAN OF CARE
Problem: OT General Care Plan  Goal: OT Goal 1  Description: Will consistently attend OT groups and improve coping strategies with increasing repertoire of ideas and understanding of symptoms of when to use the strategies.       Outcome: Improving  Note: Attended 3 of 3 OT groups. He took an active role in all opportunities, worked at a constant pace on a familiar activity requiring using planning and problem solving on the IPAD. He participated for 50 minutes with 9 pts in the goal oriented task group. He also took an active role in an activity focused on Values for 50 minutes with 7 pts which he offered insightful comments and interactions. He appears engaged and interested. In the afternoon session, he again took an active role in participating, spoke up with information and ideas in context and with positive insight. He appears invested and focused on the positive perspective.

## 2021-08-24 NOTE — PROGRESS NOTES
" 08/23/21 2200   Groups   Details   (Psychotherapy)   Number of patients attending the group:  5  Group Length:  1 Hours    Group Therapy Type: Psychotherapy    Summary of Group / Topics Discussed:      The  Psychotherapy group goal is to promote insight to positive choice and change. Group processing is within a supportive and safe environment. Patients will process emotions using verbal group and expressive psychotherapy interventions including visual art/writing interventions.    Group interventions support patients by: fostering self awareness, communication/social skills and supports and mental health management    Modalities to reach these goals include: Narrative psychology    Subjective -patient report of mood today- good day, good mood    Objective/ Intervention- Goal of group and Therapeutic modality utilized- Verbal Process group- affirmation/ Relationships/ mental health management/ empowerment    Group Response- engaged verbally, cohesive and supportive of one another    Patient Response-pt was verbally engaged. At times he would close his eyes seeming deep in thought but he would contribute to the conversation. He spoke about the importance of theodore.  Before group started he was irritated that the tv sports had to be turned off during group but a male peer told him \" be nice\", and he seemed able to regulate with this feedback.    Gene Nielsen, TINA, ATR-BC        "

## 2021-08-24 NOTE — PLAN OF CARE
BEHAVIORAL TEAM DISCUSSION    Participants: Solitario Tavera MD, Fatmata Montenegro RN, Chelsey Guy CTC, Vianney Doty OT  Progress: The patient reports that he is doing better, mood is improved and sleeping well. Patient has been attending groups and present in [facility]. Pt continues to endorse auditory hallucinations.   Anticipated length of stay: Three weeks  Continued Stay Criteria/Rationale: Patient remains symptomatic and requires continued evaluation and stabilization.   Medical/Physical: none  Precautions:   Behavioral Orders   Procedures    Code 2    Discontinue 1:1 attendant for suicide risk     Order Specific Question:   I have performed an in person assessment of the patient     Answer:   Based on this assessment the patient no longer requires a one on one attendant at this point in time.     Order Specific Question:   Rationale     Answer:   Patient States able to remain safe in hospital    Fall precautions    Routine Programming     As clinically indicated    Status 15     Every 15 minutes.     Plan: Will likely discharge to a shelter system with referrals to IRTS and after care plan for psychiatric and therapeutic providers. May be appropriate for referral to [facility] for virtual therapy while awaiting IRTS placement.   Rationale for change in precautions or plan: No changes made at this time.

## 2021-08-24 NOTE — PLAN OF CARE
Problem: Depression  Goal: Improved Mood  Outcome: Improving  Intervention: Monitor and Manage Depressive Symptoms  Recent Flowsheet Documentation  Taken 8/23/2021 1926 by Ayse Nelson RN  Supportive Measures:    active listening utilized    positive reinforcement provided    relaxation techniques promoted    self-responsibility promoted    verbalization of feelings encouraged     Problem: Anxiety  Goal: Anxiety Reduction or Resolution  Intervention: Promote Anxiety Reduction  Recent Flowsheet Documentation  Taken 8/23/2021 1926 by Ayse Nelson RN  Supportive Measures:    active listening utilized    positive reinforcement provided    relaxation techniques promoted    self-responsibility promoted    verbalization of feelings encouraged    Patient is visible in the milieu throughout the shift. He attended and participated in groups. He watched TV with other patients on the unit. He denies SI/SIB but experiencing auditory hallucinations but not as frequent as what he had during the first few days of his admission. He is smiling and interacting with staff and peers. His thoughts are intact. He has good eye contact. His appetite is excellent. Med compliant. Complained of pain on his right hip. Ibuprofen 600 mgs given as prn for pain at 1835. Patient verbalized relief of the pain an hour after. He is med compliant. Covid Test done. Result not available yet. Patient refused to have his BS checked. Patient was interviewed today for possible IRTS placement.

## 2021-08-24 NOTE — PROGRESS NOTES
Behavioral Health  Note    Behavioral Health  Spirituality Group Note    [address] [place]    Name: Hi Do YOB: 1965   MRN: [id number] Age: 56 year old      Patient attended -led group, which included discussion of spirituality, coping with illness and building resilience.    Patient attended group for 1 hrs.    The patient actively participated in group discussion and patient demonstrated an appreciation of topic's application for their personal circumstances.      Liliana Khan  Chaplain Resident  Pager: [phone]

## 2021-08-24 NOTE — PROGRESS NOTES
"SPIRITUAL HEALTH SERVICES  SPIRITUAL ASSESSMENT Progress Note  [facility] ([facility] [place] [address] [place]     REFERRAL SOURCE: after group request    Patient wanted to talk about his personal spirituality.  He shared how he's given so much to his theodore and yet he's still had this kind of life.  He's asking the questions of \"why\" and also, \"what does God want me to do.\"  He says he knows God loves him and that where he's at now is a result of some of the decisions he's made. He also shared briefly some of the trauma he experienced as a young one.    PLAN: [facility] remains available per pt/request.    Liliana Khan  Chaplain Resident  Pager: [phone]    "

## 2021-08-24 NOTE — PLAN OF CARE
Problem: Sleep Disturbance  Goal: Adequate Sleep/Rest  Outcome: No Change     Slept for 6.5 hours tonight, did not request for sleep medication when he was awake, able to fall right back to sleep.

## 2021-08-24 NOTE — PROGRESS NOTES
"[facility], [place]   Psychiatric Progress Note        Interim History:   The patient's care was discussed with the treatment team during the daily team meeting and/or staff's chart notes were reviewed.  Staff report patient has been doing well. Denies SI. Still endorsing auditory hallucinations but they are diminished. Overall \"looks good.\"     The patient reports that he is doing better. Mood is improved. Sleeping well. Says that his IRTS interview was \"great.\" Says that they would maybe be able to take him \"in a couple of days\". Denies SI. Says that his AH are \"subsiding.\" Denies any VH. Overall doing better and would like to go directly to the IRTS if possible.          Medications:       aspirin  81 mg Oral Daily     atorvastatin  20 mg Oral Daily     DULoxetine  60 mg Oral QAM     gabapentin  600 mg Oral BID     lidocaine  1-3 patch Transdermal Q24H     lidocaine   Transdermal Q8H     QUEtiapine  200 mg Oral At Bedtime     QUEtiapine  25 mg Oral BID          Allergies:   No Known Allergies       Labs:     Recent Results (from the past 24 hour(s))   Asymptomatic COVID-19 Virus (Coronavirus) by PCR Nasopharyngeal    Collection Time: 08/23/21  6:48 PM    Specimen: Nasopharyngeal; Swab   Result Value Ref Range    SARS CoV2 PCR Negative Negative          Psychiatric Examination:     /78 (BP Location: Right arm)   Pulse 87   Temp 97.5  F (36.4  C) (Temporal)   Resp 16   Ht 1.753 m (5' 9\")   Wt 105.9 kg (233 lb 8 oz)   SpO2 100%   BMI 34.48 kg/m    Weight is 233 lbs 8 oz  Body mass index is 34.48 kg/m .  Orthostatic Vitals       Most Recent      Sitting Orthostatic BP --  Comment: refused 08/22 0805    Standing Orthostatic /77 08/23 0832    Standing Orthostatic Pulse (bpm) 94 08/23 0832            Appearance: awake, alert and adequately groomed  Attitude:  cooperative  Eye Contact:  good  Mood:  better  Affect:  mood congruent  Speech:  clear, coherent  Psychomotor " Behavior:  no evidence of tardive dyskinesia, dystonia, or tics  Thought Process:  goal oriented  Associations:  no loose associations  Thought Content:  no evidence of suicidal ideation or homicidal ideation and auditory hallucinations present and improving  Insight:  fair  Judgement:  fair  Oriented to:  time, person, and place  Attention Span and Concentration:  intact  Recent and Remote Memory:  fair    Clinical Global Impressions  First:  Considering your total clinical experience with this particular patient population, how severe are the patient's symptoms at this time?: 7 (08/16/21 0843)  Compared to the patient's condition at the START of treatment, this patient's condition is: 7 (08/16/21 0843)  Most recent:  Considering your total clinical experience with this particular patient population, how severe are the patient's symptoms at this time?: 7 (08/16/21 0843)  Compared to the patient's condition at the START of treatment, this patient's condition is: 7 (08/16/21 0843)         Precautions:     Behavioral Orders   Procedures     Code 2     Discontinue 1:1 attendant for suicide risk     Order Specific Question:   I have performed an in person assessment of the patient     Answer:   Based on this assessment the patient no longer requires a one on one attendant at this point in time.     Order Specific Question:   Rationale     Answer:   Patient States able to remain safe in hospital     Fall precautions     Routine Programming     As clinically indicated     Status 15     Every 15 minutes.          Diagnoses:     1.  Major depressive disorder, recurrent, severe, with anxious distress and psychosis  2.  Generalized anxiety disorder  3.  PTSD  4.  Cocaine use disorder            Plan:     Medications will include the following:  --Cymbalta 60 mg every morning  --Gabapentin 600 mg twice a day  --Seroquel 200 mg at bedtime  --Seroquel 25 mg twice a day    IRTS interview yesterday. Patient likely to discharge  later this week.       Disposition Plan   Reason for ongoing admission: is unable to care for self due to severe psychosis or jammie  Discharge location: IRTS facility  Discharge Medications: not ordered  Follow-up Appointments: not scheduled  Legal Status: voluntary    Entered by: Solitario Tavera on 8/12/21 at 11:01 AM

## 2021-08-24 NOTE — PLAN OF CARE
Assessment/Intervention/Current Symtoms and Care Coordination:  Chart Review  Met with team  Received phone message from Will at [facility]. Will stated that interview went well yesterday and that they have the patient on wait lists for all of their locations. Will stated they did not have any immediate openings, and the next one was likely two weeks out. Said further questions could be addressed to admissions at [phone].  Met briefly with patient, patient was pleasant and receptive. Writer relayed information about [facility]. Patient expressed hope for getting into an IRTS soon. Patient stated he was feeling good, enjoying groups, and feeling connected to the other patients and staff.      Discharge Plan or Goal:  Pending stabilization & development of a safe discharge plan.  Considerations include: Intensive Residential Treatment Services (IRTS):       Barriers to Discharge:  Patient requires further psychiatric stabilization due to current symptomology and Medication management with possible adjustments     Referral Status:  No new referrals made today.     Legal Status:  Patient is voluntary

## 2021-08-24 NOTE — PLAN OF CARE
Problem: Depression  Goal: Improved Mood  Outcome: Improving     Problem: Anxiety  Goal: Anxiety Reduction or Resolution  Outcome: Improving    Pt was out early for breakfast. Pt pleasant and cooperative during interactions. Pt affect is bright with appropriate eye contacts.  Pt is social with select peers and has been attending programming.

## 2021-08-25 PROCEDURE — 250N000013 HC RX MED GY IP 250 OP 250 PS 637: Performed by: EMERGENCY MEDICINE

## 2021-08-25 PROCEDURE — 250N000013 HC RX MED GY IP 250 OP 250 PS 637: Performed by: PHYSICIAN ASSISTANT

## 2021-08-25 PROCEDURE — 250N000013 HC RX MED GY IP 250 OP 250 PS 637: Performed by: NURSE PRACTITIONER

## 2021-08-25 PROCEDURE — G0177 OPPS/PHP; TRAIN & EDUC SERV: HCPCS

## 2021-08-25 PROCEDURE — 124N000003 HC R&B MH SENIOR/ADOLESCENT

## 2021-08-25 PROCEDURE — H2032 ACTIVITY THERAPY, PER 15 MIN: HCPCS

## 2021-08-25 RX ADMIN — ASPIRIN 81 MG CHEWABLE TABLET 81 MG: 81 TABLET CHEWABLE at 08:09

## 2021-08-25 RX ADMIN — QUETIAPINE FUMARATE 25 MG: 25 TABLET ORAL at 14:14

## 2021-08-25 RX ADMIN — IBUPROFEN 600 MG: 200 TABLET, FILM COATED ORAL at 08:40

## 2021-08-25 RX ADMIN — DULOXETINE HYDROCHLORIDE 60 MG: 60 CAPSULE, DELAYED RELEASE ORAL at 08:09

## 2021-08-25 RX ADMIN — GABAPENTIN 600 MG: 600 TABLET, FILM COATED ORAL at 08:09

## 2021-08-25 RX ADMIN — QUETIAPINE FUMARATE 25 MG: 25 TABLET ORAL at 08:09

## 2021-08-25 RX ADMIN — ATORVASTATIN CALCIUM 20 MG: 20 TABLET, FILM COATED ORAL at 08:09

## 2021-08-25 RX ADMIN — QUETIAPINE 200 MG: 200 TABLET, FILM COATED ORAL at 21:48

## 2021-08-25 RX ADMIN — GABAPENTIN 600 MG: 600 TABLET, FILM COATED ORAL at 21:50

## 2021-08-25 RX ADMIN — IBUPROFEN 600 MG: 200 TABLET, FILM COATED ORAL at 21:49

## 2021-08-25 ASSESSMENT — ACTIVITIES OF DAILY LIVING (ADL)
HYGIENE/GROOMING: INDEPENDENT
ORAL_HYGIENE: INDEPENDENT
LAUNDRY: WITH SUPERVISION
DRESS: INDEPENDENT

## 2021-08-25 NOTE — PLAN OF CARE
Problem: Sleep Disturbance  Goal: Adequate Sleep/Rest  Outcome: Improving     Slept well tonight for 6.5 hours  No concerns raised

## 2021-08-25 NOTE — PLAN OF CARE
Problem: OT General Care Plan  Goal: OT Goal 1  Description: Will consistently attend OT groups and improve coping strategies with increasing repertoire of ideas and understanding of symptoms of when to use the strategies.       Note: Attended 2 of 2 OT groups. He participated for 50 minutes with 5 pts in a goal oriented task group. He worked independently on a familiar task on the IPAD, initiated comments and was social on approach. He also participated in an activity requiring using more complex problem solving with visuospatial concepts. He seemed to prefer to accomplish the solutions on his own and at times offered less planning in finding the solutions. He was pleasant with others and supportive of peers. He was assertive and clear in expressing himself in a tactful explanation, in responding to a pt about him not wearing a face mask (Covid protocol) which he quickly put on when reminded and was happy to wear it.

## 2021-08-25 NOTE — PLAN OF CARE
Number of patients attending the group:  3  Group Length:  0.5 Hour    Group Therapy     Summary of Group / Topics Discussed:    The  psychotherapy group goal is to promote insight to positive choice and change. Group processing is within a supportive and safe environment. Patients processed emotions using verbal group and expressive psychotherapy interventions.    This group focused on raising pt's insights on their mental health, by reviewing indicators of stress, and how to address those in the contexts of self-dignity, advocacy, and early warning sign identification.    Assessment:   This patient attended and stayed throughout the group's duration. He did provided specific feedback to group members and to facilitator. Pt appeared to struggle to stay awake during the group.     Patient Response: Pt reported that he found the group content very helpful and reported willingness to implement them in the future.

## 2021-08-25 NOTE — PLAN OF CARE
Pt visible in milieu. Social with peers. Engaging in distraction by coloring, doing puzzles. Affect WDL. Eye contact good. Reports depression is improved and feels hopeful. Reports IRTS interview went well and he hopes to discharge soon. Med-compliant. Cooperative, pleasant. Continue with current treatment plan and recommendations. Continue to monitor and reassess symptoms. Monitor response to medications. Monitor progress towards treatment goals. Encourage groups and participation.

## 2021-08-25 NOTE — PLAN OF CARE
Pt participated in Therapeutic Recreation group with focus on socializing, problem solving, and leisure participation. Engaged and cooperative in group recreational intervention; word puzzles. Pt participated throughout entire duration of the group. Pt affect was flat and mood was calm. Pt added to the group discussion during the activity, relating the discussion to each puzzle. Pt talked about what each quote meant to him.

## 2021-08-25 NOTE — PLAN OF CARE
Problem: Suicidal Behavior  Goal: Suicidal Behavior is Absent or Managed  Outcome: Improving     Problem: Depression  Goal: Improved Mood  Outcome: Improving     Problem: Anxiety  Goal: Anxiety Reduction or Resolution  Outcome: Improving   Pt rated depression at 2/10 and anxiety at 5/10 (where 10 is the worst). Pt reports feeling more hopeful than he has in a long time. Pt denies SI/SIB/wishes to be dead or hallucinations. Pt eye contact is appropriate and he brightens during interactions. Pt has been visible in common area's, attending programming and is interactive with select peers.   Pt reports right hip pain has improved however he has developed right groin pain. Elizabeth from the medical team was consulted with request to assess patient per his request.   Pt is waiting for placement at IR facility.

## 2021-08-26 PROCEDURE — 250N000013 HC RX MED GY IP 250 OP 250 PS 637: Performed by: PHYSICIAN ASSISTANT

## 2021-08-26 PROCEDURE — 124N000003 HC R&B MH SENIOR/ADOLESCENT

## 2021-08-26 PROCEDURE — 250N000013 HC RX MED GY IP 250 OP 250 PS 637: Performed by: NURSE PRACTITIONER

## 2021-08-26 PROCEDURE — 250N000013 HC RX MED GY IP 250 OP 250 PS 637: Performed by: EMERGENCY MEDICINE

## 2021-08-26 PROCEDURE — G0177 OPPS/PHP; TRAIN & EDUC SERV: HCPCS

## 2021-08-26 PROCEDURE — 99232 SBSQ HOSP IP/OBS MODERATE 35: CPT | Performed by: NURSE PRACTITIONER

## 2021-08-26 RX ADMIN — GABAPENTIN 600 MG: 600 TABLET, FILM COATED ORAL at 21:24

## 2021-08-26 RX ADMIN — IBUPROFEN 600 MG: 200 TABLET, FILM COATED ORAL at 20:06

## 2021-08-26 RX ADMIN — QUETIAPINE 200 MG: 200 TABLET, FILM COATED ORAL at 21:24

## 2021-08-26 RX ADMIN — GABAPENTIN 600 MG: 600 TABLET, FILM COATED ORAL at 08:55

## 2021-08-26 RX ADMIN — QUETIAPINE FUMARATE 25 MG: 25 TABLET ORAL at 08:55

## 2021-08-26 RX ADMIN — DULOXETINE HYDROCHLORIDE 60 MG: 60 CAPSULE, DELAYED RELEASE ORAL at 08:55

## 2021-08-26 RX ADMIN — ASPIRIN 81 MG CHEWABLE TABLET 81 MG: 81 TABLET CHEWABLE at 08:55

## 2021-08-26 RX ADMIN — ATORVASTATIN CALCIUM 20 MG: 20 TABLET, FILM COATED ORAL at 08:55

## 2021-08-26 RX ADMIN — QUETIAPINE FUMARATE 25 MG: 25 TABLET ORAL at 13:27

## 2021-08-26 RX ADMIN — IBUPROFEN 600 MG: 200 TABLET, FILM COATED ORAL at 13:27

## 2021-08-26 ASSESSMENT — ACTIVITIES OF DAILY LIVING (ADL)
ORAL_HYGIENE: INDEPENDENT
HYGIENE/GROOMING: INDEPENDENT
ORAL_HYGIENE: INDEPENDENT
HYGIENE/GROOMING: INDEPENDENT
DRESS: SCRUBS (BEHAVIORAL HEALTH);INDEPENDENT
LAUNDRY: WITH SUPERVISION
DRESS: INDEPENDENT;SCRUBS (BEHAVIORAL HEALTH)

## 2021-08-26 ASSESSMENT — MIFFLIN-ST. JEOR: SCORE: 1874.07

## 2021-08-26 NOTE — PLAN OF CARE
"  Pt continues to be visible in milieu. Social with peers and engaged in activities. Attending therapeutic groups and participating. Eye contact good. Affect bright. Reports improvement in mood. Denies SI. Reports some anxiety related to uncertainty about when he will be able to discharge to IRTS and \"taking the next step.\" Thoughts are goal-oriented. Reports feeling hopeful. Med-compliant. Pleasant, cooperative. Continue with current treatment plan and recommendations. Continue to monitor and reassess symptoms. Monitor response to medications. Monitor progress towards treatment goals. Encourage groups and participation.   "

## 2021-08-26 NOTE — PROGRESS NOTES
Call to pt's mom re: negative strep test, per pt note, ok to leave detailed message, message left.  Will call with covid results when available.     [facility], [place]   Psychiatric Progress Note        Interim History:   From H&P: Hi Do is a 56 year old male with PMH significant for PTSD, MDD with psychotic features, and polysubstance abuse who presents to the Emergency Department for evaluation of suicidal ideation.  Patient was [place] by the police [place] a bridge with the intention of jumping off to kill himself.  Patient reports auditory and visual hallucinations that are telling him he is worthless and that he needs to kill himself.  He also indicates he sees very disturbing, graphic images.  Patient has a hip problem from jumping off a third floor balcony 7 weeks ago also in a suicide attempt.  Patient was recently hospitalized and set up with a bed in a IRTS facility, but he did not go to this and went back to his apartment.      Team meeting report: The patient's care was discussed with the treatment team during the daily team meeting and/or staff's chart notes were reviewed.  Staff report patient has been calm, pleasant, cooperative.  Attending and participating in all groups.  He is very polite and appropriate.  The depression and anxiety are low.  Denies hallucinations.  He is sleeping and eating well.    Met with patient and  Georgiana.  Confirms information in the previous paragraph.  States he is doing really well.  Discussed disposition.  The patient is aware that that there is a long waiting list to get into IRTS and will need to go to crisis bed prior to that.  He is agreeable.  Georgiana gave him the phone numbers for [facility] to call for an interview.  They had openings today.I was notified that that he did not do that and missed the beds.  We will try.  Is appropriate for discharge.         Medications:       aspirin  81 mg Oral Daily     atorvastatin  20 mg Oral Daily     DULoxetine  60 mg Oral QAM     gabapentin  600 mg Oral BID     lidocaine  1-3 patch Transdermal Q24H     lidocaine    Transdermal Q8H     QUEtiapine  200 mg Oral At Bedtime     QUEtiapine  25 mg Oral BID          Allergies:   No Known Allergies       Labs:     Recent Results (from the past 672 hour(s))   COVID-19 VIRUS (CORONAVIRUS) BY PCR (EXTERNAL RESULT)    Collection Time: 07/31/21  4:42 PM   Result Value Ref Range    COVID-19 Virus by PCR (External Result) Negative Negative   Drug abuse screen 1 urine (ED)    Collection Time: 08/13/21 12:11 AM   Result Value Ref Range    Amphetamines Urine Screen Negative Screen Negative    Barbiturates Urine Screen Negative Screen Negative    Benzodiazepines Urine Screen Negative Screen Negative    Cannabinoids Urine Screen Negative Screen Negative    Cocaine Urine Screen Positive (A) Screen Negative    Opiates Urine Screen Negative Screen Negative   Comprehensive metabolic panel    Collection Time: 08/13/21  2:09 AM   Result Value Ref Range    Sodium 136 133 - 144 mmol/L    Potassium 4.3 3.4 - 5.3 mmol/L    Chloride 102 94 - 109 mmol/L    Carbon Dioxide (CO2) 29 20 - 32 mmol/L    Anion Gap 5 3 - 14 mmol/L    Urea Nitrogen 29 7 - 30 mg/dL    Creatinine 1.14 0.66 - 1.25 mg/dL    Calcium 9.0 8.5 - 10.1 mg/dL    Glucose 191 (H) 70 - 99 mg/dL    Alkaline Phosphatase 101 40 - 150 U/L    AST 27 0 - 45 U/L    ALT 37 0 - 70 U/L    Protein Total 7.4 6.8 - 8.8 g/dL    Albumin 3.4 3.4 - 5.0 g/dL    Bilirubin Total 0.7 0.2 - 1.3 mg/dL    GFR Estimate 70 >60 mL/min/1.73m2   CBC with platelets and differential    Collection Time: 08/13/21  2:09 AM   Result Value Ref Range    WBC Count 7.1 4.0 - 11.0 10e3/uL    RBC Count 4.67 4.40 - 5.90 10e6/uL    Hemoglobin 13.4 13.3 - 17.7 g/dL    Hematocrit 39.8 (L) 40.0 - 53.0 %    MCV 85 78 - 100 fL    MCH 28.7 26.5 - 33.0 pg    MCHC 33.7 31.5 - 36.5 g/dL    RDW 12.8 10.0 - 15.0 %    Platelet Count 118 (L) 150 - 450 10e3/uL    % Neutrophils 58 %    % Lymphocytes 27 %    % Monocytes 10 %    % Eosinophils 4 %    % Basophils 1 %    % Immature Granulocytes 0 %    NRBCs  per 100 WBC 0 <1 /100    Absolute Neutrophils 4.0 1.6 - 8.3 10e3/uL    Absolute Lymphocytes 1.9 0.8 - 5.3 10e3/uL    Absolute Monocytes 0.7 0.0 - 1.3 10e3/uL    Absolute Eosinophils 0.3 0.0 - 0.7 10e3/uL    Absolute Basophils 0.1 0.0 - 0.2 10e3/uL    Absolute Immature Granulocytes 0.0 <=0.0 10e3/uL    Absolute NRBCs 0.0 10e3/uL   SARS-COV2 (COVID-19) Virus RT-PCR    Collection Time: 08/13/21  2:10 AM    Specimen: Nasopharyngeal; Swab   Result Value Ref Range    SARS CoV2 PCR Negative Negative   Glucose by meter    Collection Time: 08/13/21  2:27 AM   Result Value Ref Range    GLUCOSE BY METER POCT 182 (H) 70 - 99 mg/dL   Glucose by meter    Collection Time: 08/20/21 12:20 PM   Result Value Ref Range    GLUCOSE BY METER POCT 242 (H) 70 - 99 mg/dL   Asymptomatic COVID-19 Virus (Coronavirus) by PCR Nasopharyngeal    Collection Time: 08/23/21  6:48 PM    Specimen: Nasopharyngeal; Swab   Result Value Ref Range    SARS CoV2 PCR Negative Negative            Psychiatric Examination:   Temp: (!) 96.3  F (35.7  C) Temp src: Temporal BP: 112/80 Pulse: 90   Resp: 16 SpO2: 98 % O2 Device: None (Room air)    Weight is 234 lbs 8 oz  Body mass index is 34.63 kg/m .    Appearance: awake, cooperative, no apparent distress and mildly obese  Attitude:  cooperative  Eye Contact:  good  Mood:  anxious and depressed  Affect:  intensity is flat  Speech:  mumbling  Psychomotor Behavior:  no evidence of tardive dyskinesia, dystonia, or tics  Throught Process:  logical and goal oriented  Associations:  no loose associations  Thought Content:  no evidence of suicidal ideation or homicidal ideation, auditory hallucinations present and visual hallucinations present  Insight:  good  Judgement:  intact  Oriented to:  time, person, and place  Attention Span and Concentration:  intact  Recent and Remote Memory:  intact         Precautions:     Behavioral Orders   Procedures     Code 2     Discontinue 1:1 attendant for suicide risk     Order Specific  Question:   I have performed an in person assessment of the patient     Answer:   Based on this assessment the patient no longer requires a one on one attendant at this point in time.     Order Specific Question:   Rationale     Answer:   Patient States able to remain safe in hospital     Fall precautions     Routine Programming     As clinically indicated     Status 15     Every 15 minutes.          DIagnoses:   1.  Major depressive disorder, recurrent, severe, with anxious distress and psychosis  2.  Generalized anxiety disorder  3.  PTSD  4.  Cocaine use disorder  5.  Malingering is suspected         Plan:   Medications will include the following:  --Cymbalta 60 mg every morning  --Gabapentin 600 mg twice a day  --Seroquel 200 mg at bedtime  --Seroquel 25 mg twice a day  --As needed medications will include hydroxyzine, Zyprexa, and trazodone    --Blood work was reviewed  --Internal medicine follow-up for medical problems       Disposition Plan   Reason for ongoing admission: is unable to care for self due to depression, psychosis  Disposition: TBD, likely IRTS  Estimated length of stay: 5-7 days  Legal Status:  voluntary  Discharge will be granted once symptoms improved.    Emely MOE, CNP      This note was created with the help of Dragon dictation system. All grammatical/typing errors or context distortion are unintentional and inherent to software.

## 2021-08-26 NOTE — PLAN OF CARE
Problem: OT General Care Plan  Goal: OT Goal 1  Description: Will consistently attend OT groups and improve coping strategies with increasing repertoire of ideas and understanding of symptoms of when to use the strategies.       Note: Attended 2 of 3 OT groups, being the a.m. sessions, missing the afternoon session with patient stating he was planning for discharge today.  In the first a.m. group he participated in goal oriented tasks for 60 minutes with 8 patients.  He worked at a constant pace on an activity on the iPad familiar to him, appearing to concentrate on task as well as joining in some conversation while working.  In the second session he participated for 50 minutes in an activity focused on coping strategies with 7 patients.  He initiated speaking up first on several occasions, offered elaboration on his answers, which were insightful in context.  He seemed interested, engaged, and attentive.

## 2021-08-26 NOTE — PLAN OF CARE
"  Problem: General Rehab Plan of Care  Goal: Therapeutic Recreation/Music Therapy Goal  Description: The patient and/or their representative will achieve their patient-specific goals related to the plan of care.  Outcome: No Change     Hi attended art therapy group (8 patients total). He reported feeling \"good.\" Patient participated in the art directive to draw to the music to practice feeling a state of creative flow. 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 He requested some songs that fit with the activity. The group liked Hi's songs suggestions. Jonathon interacted appropriately with peers, making friendly conversation, asking curious questions, and complimenting other's work.   "

## 2021-08-26 NOTE — PLAN OF CARE
"  Problem: Suicidal Behavior  Goal: Suicidal Behavior is Absent or Managed  Outcome: Improving  Flowsheets (Taken 8/26/2021 1042)  Mutually Determined Action Steps (Suicidal Behavior Absent/Managed): sets future-oriented goal     Problem: Depression  Goal: Improved Mood  Outcome: Improving     Problem: Anxiety  Goal: Anxiety Reduction or Resolution  Outcome: Improving   Pt continue to report improved mood. Pt denies SI/SIB/hallucinations. Pt has been attending programming and is social with select peers.   Pt's affect brightens during interactions, he has appropriate eye contact and is engaged during conversations.     Pt identifies feeling more anxious this PM due to \"the unknown\". Pt has been visible in common areas and is pleasant.   Pt is medication compliant. Pt requested/recieved prn ibuprofen 600 mg this PM at 1330.  Pt approached writer and stated that he can go today after talking with [employer] [facility].   Rere/CTC notified.  1330- [facility] CM on the unit to meet with patient.   "

## 2021-08-26 NOTE — PROGRESS NOTES
Assessment/Intervention/Current Symptoms and Care Coordination  The patient's care was discussed with the treatment team and chart notes were reviewed.       [facility] called [facility] in regards to crisis bed availability at [facility]. They told writer that patient will have to call and do a phone interview. Writer gave patient the phone number to call and asked him to call and request an interview.     Writer checked in with patient at 12 pm and asked if he was able to do the interview. He notified writer that he decided to go to groups and did not call [facility] yet. Writer asked him to prioritize that and call them as soon as possible. Few minutes later, patient notified writer that he called them and had to leave a voicemail.     Patient later notified writer that he spoke with someone who told him that they have a bed available in [place] and can take him today but the inpatient  will have to call them first to give them more clinical information. When [facility] called, writer was notified that the bed is no longer available. Writer was also told that they only do same day referrals and will not be able to place the patient on a waiting. [facility] was told to call back tomorrow. They do not place patients on a waiting list. Beds are offered on first come first serve basis.     Writer received a call from Jeny- patient's  from mental health resources. She notified writer that patient has housing and has a  who has been working with him. She also notified writer that patient was reported by other tenants in the building and accused him of stealing money from them. She stated that she is unsure if he will be able to return to [facility] which he has been residing before he was admitted. Writer notified her that he did not disclose this information to us and we were not aware that he has an apartment. Jeny stated that his  is Sade  from [place] ([phone]) and that Sade would be able to tell me if patient can return to this facility or not.     Writer called Sade, left her a voicemail and requested a call back to discuss patient's current housing situation.      Discharge Plan or Goal  Discharge to an IRTS facility. patient might to a shelter/crisis bed if he stabilizes before he gets accepted to an IRTS facility.      Barriers to Discharge   Needs further psychiatric evaluation, stabilization and discharge planning. Patient is currently endorsing symptoms of hallucinations, flashbacks, suicidal thoughts, depression (rates severity level at 9/10) and anxiety (rates severity level at 9/10).      Referral Status  [facility] Sent referrals to the following IRTS facilities.     [facility]: Fax: [phone]. Phone number: [phone]   Currently on the waiting list.       [facility] (Ph: [phone]; Central Access team at [phone], Central Intake Fax: [phone], Email: [email or web address].  Patient was interviewed b     8/18- [facility] Left a voicemail and requested a call back with updates regarding this referral.      [facility]- Referral was sent to [email or web address]  Patient was placed on a waiting list. Waiting list is currently 2-4 weeks long.      Michael Chavez ([phone])   [facility] is currently reviewing this referral. Will reach out to [facility] when a bed becomes available.      [facility] program ([phone]). [facility] was sent. [facility] is waiting to hear back from intake regarding bed availability and acceptance decision.   Legal Status  Vol

## 2021-08-27 VITALS
TEMPERATURE: 97.3 F | HEART RATE: 85 BPM | DIASTOLIC BLOOD PRESSURE: 80 MMHG | OXYGEN SATURATION: 97 % | HEIGHT: 69 IN | WEIGHT: 234.5 LBS | SYSTOLIC BLOOD PRESSURE: 112 MMHG | BODY MASS INDEX: 34.73 KG/M2 | RESPIRATION RATE: 16 BRPM

## 2021-08-27 PROCEDURE — 99238 HOSP IP/OBS DSCHRG MGMT 30/<: CPT | Performed by: NURSE PRACTITIONER

## 2021-08-27 PROCEDURE — 250N000013 HC RX MED GY IP 250 OP 250 PS 637: Performed by: NURSE PRACTITIONER

## 2021-08-27 PROCEDURE — G0177 OPPS/PHP; TRAIN & EDUC SERV: HCPCS

## 2021-08-27 PROCEDURE — 250N000013 HC RX MED GY IP 250 OP 250 PS 637: Performed by: EMERGENCY MEDICINE

## 2021-08-27 RX ADMIN — DULOXETINE HYDROCHLORIDE 60 MG: 60 CAPSULE, DELAYED RELEASE ORAL at 08:51

## 2021-08-27 RX ADMIN — QUETIAPINE FUMARATE 25 MG: 25 TABLET ORAL at 08:51

## 2021-08-27 RX ADMIN — QUETIAPINE FUMARATE 25 MG: 25 TABLET ORAL at 15:32

## 2021-08-27 RX ADMIN — GABAPENTIN 600 MG: 600 TABLET, FILM COATED ORAL at 08:51

## 2021-08-27 RX ADMIN — ATORVASTATIN CALCIUM 20 MG: 20 TABLET, FILM COATED ORAL at 08:51

## 2021-08-27 RX ADMIN — ASPIRIN 81 MG CHEWABLE TABLET 81 MG: 81 TABLET CHEWABLE at 08:51

## 2021-08-27 ASSESSMENT — ACTIVITIES OF DAILY LIVING (ADL)
LAUNDRY: WITH SUPERVISION
HYGIENE/GROOMING: INDEPENDENT
ORAL_HYGIENE: INDEPENDENT
DRESS: INDEPENDENT

## 2021-08-27 NOTE — PLAN OF CARE
"  Problem: Depression  Goal: Improved Mood  Outcome: No Change    Patient visible and social in milieu, affect full range, reports having an emotional day and quite anxious earlier today that has subsided this evening; chronic SI thoughts intermittently, denies any specific thought, plan or intent and agrees to approach staff if feel unsafe or need assistance coping; declines blood glucose check this evening, \"not needed\", states appetite and sleep good about 6-7 hours nightly; using prn ibuprofen helpful with chronic pain; takes medications and denies concerns, VSS and no aggression or other behaviors.     "

## 2021-08-27 NOTE — PLAN OF CARE
Problem: OT General Care Plan  Goal: OT Goal 1  Description: Will consistently attend OT groups and improve coping strategies with increasing repertoire of ideas and understanding of symptoms of when to use the strategies.       Outcome: Completed  Note: Attended 2 of 2 OT groups. He participated for 45 minutes in a goal oriented task group with 7 pts. He worked quietly and independently on a familiar activity on the IPAD. He was pleasant and social on approach. He also participated for 50 minutes in an activity with 7 pts focused on Reflecting on the Week. He stated noting gains in being here, from groups and the staff and appreciates the benefits. He was pleasant and supportive of others.

## 2021-08-27 NOTE — DISCHARGE SUMMARY
Psychiatric Discharge Summary    Hi Do MRN# [id number]   Age: 56 year old YOB: 1965     Date of Admission:  8/12/2021  Date of Discharge:  8/27/2021  Admitting Provider:  Solitairo Tavera MD  Discharge Provider:  PAAYL Greenwood CNP         Event Leading to Hospitalization:   HPI at initial presentation per ER/A&R notes:   Hi Do is a 56 year old male with PMH significant for PTSD, MDD with psychotic features, and polysubstance abuse who presents to the Emergency Department for evaluation of suicidal ideation.  Patient was found by the police [place] a bridge with the intention of jumping off to kill himself.  Patient reports auditory and visual hallucinations that are telling him he is worthless and that he needs to kill himself.  He also indicates he sees very disturbing, graphic images.  Patient has a hip problem from jumping off a third floor balcony 7 weeks ago also in a suicide attempt.  Patient was recently hospitalized and set up with a bed in a [facility] facility, but he did not go to this and went back to his apartment.  He also did not follow-up with the outpatient appointments scheduled for him to establish psychiatrist and therapist.  He then eventually stopped taking his medications and the symptoms got worse.  Patient has a history of substance abuse, but denies recent use.  Patient reports that he was raised in foster care since 9 months old.  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  He was  and had kids.  Patient reports that he has not worked since 2011 and has been on government assistance.  He states that he is currently homeless.        Today, patient reports history as above.  Pt said he is tired of hearing the voices and hallucinations and wants to reinvent himself.  Pt said he  "retired and he is now just started to address his mental health problems (later said he hasn't worked since 2000).  Pt said he has had a hard time the last 2 years because the he started to hear voices telling him to kill himself and seeing people in his apartment.  Pt said every day is a struggle and he's surviving not living.  Pt said he did cocaine to take the voices away.  Pt said he didn't have any cocaine in his system when he climbed up to the bridge.   Pt notes not taking medications recently.      Pt said he got Seroquel last night and the first time he's been able to rest in several days.  Pt said he is homeless now and said he needs a psychiatrist, therapist and PCA worker.  Pt said he is not organized enough to be on the street.  [place] [facility] [place] and he was going to go to [place], but he wanted to save his money for a place to live.  Pt wished he had gone there.       Pt reports his hip has pain.  Pt reviewed history from childhood and his \"generational curse\"    See Admission note by Troy Daily MD, on 8/14/2021 for additional details.          DIagnoses:     1.  Major depressive disorder, recurrent, severe, with anxious distress and psychosis  2.  Generalized anxiety disorder  3.  PTSD  4.  Cocaine use disorder  5.  Malingering is suspected         Labs:     Recent Results (from the past 504 hour(s))   Drug abuse screen 1 urine (ED)    Collection Time: 08/13/21 12:11 AM   Result Value Ref Range    Amphetamines Urine Screen Negative Screen Negative    Barbiturates Urine Screen Negative Screen Negative    Benzodiazepines Urine Screen Negative Screen Negative    Cannabinoids Urine Screen Negative Screen Negative    Cocaine Urine Screen Positive (A) Screen Negative    Opiates Urine Screen Negative Screen Negative   Comprehensive metabolic panel    Collection Time: 08/13/21  2:09 AM   Result Value Ref Range    Sodium 136 133 - 144 mmol/L    Potassium 4.3 3.4 - 5.3 mmol/L    " Chloride 102 94 - 109 mmol/L    Carbon Dioxide (CO2) 29 20 - 32 mmol/L    Anion Gap 5 3 - 14 mmol/L    Urea Nitrogen 29 7 - 30 mg/dL    Creatinine 1.14 0.66 - 1.25 mg/dL    Calcium 9.0 8.5 - 10.1 mg/dL    Glucose 191 (H) 70 - 99 mg/dL    Alkaline Phosphatase 101 40 - 150 U/L    AST 27 0 - 45 U/L    ALT 37 0 - 70 U/L    Protein Total 7.4 6.8 - 8.8 g/dL    Albumin 3.4 3.4 - 5.0 g/dL    Bilirubin Total 0.7 0.2 - 1.3 mg/dL    GFR Estimate 70 >60 mL/min/1.73m2   CBC with platelets and differential    Collection Time: 08/13/21  2:09 AM   Result Value Ref Range    WBC Count 7.1 4.0 - 11.0 10e3/uL    RBC Count 4.67 4.40 - 5.90 10e6/uL    Hemoglobin 13.4 13.3 - 17.7 g/dL    Hematocrit 39.8 (L) 40.0 - 53.0 %    MCV 85 78 - 100 fL    MCH 28.7 26.5 - 33.0 pg    MCHC 33.7 31.5 - 36.5 g/dL    RDW 12.8 10.0 - 15.0 %    Platelet Count 118 (L) 150 - 450 10e3/uL    % Neutrophils 58 %    % Lymphocytes 27 %    % Monocytes 10 %    % Eosinophils 4 %    % Basophils 1 %    % Immature Granulocytes 0 %    NRBCs per 100 WBC 0 <1 /100    Absolute Neutrophils 4.0 1.6 - 8.3 10e3/uL    Absolute Lymphocytes 1.9 0.8 - 5.3 10e3/uL    Absolute Monocytes 0.7 0.0 - 1.3 10e3/uL    Absolute Eosinophils 0.3 0.0 - 0.7 10e3/uL    Absolute Basophils 0.1 0.0 - 0.2 10e3/uL    Absolute Immature Granulocytes 0.0 <=0.0 10e3/uL    Absolute NRBCs 0.0 10e3/uL   SARS-COV2 (COVID-19) Virus RT-PCR    Collection Time: 08/13/21  2:10 AM    Specimen: Nasopharyngeal; Swab   Result Value Ref Range    SARS CoV2 PCR Negative Negative   Glucose by meter    Collection Time: 08/13/21  2:27 AM   Result Value Ref Range    GLUCOSE BY METER POCT 182 (H) 70 - 99 mg/dL   Glucose by meter    Collection Time: 08/20/21 12:20 PM   Result Value Ref Range    GLUCOSE BY METER POCT 242 (H) 70 - 99 mg/dL   Asymptomatic COVID-19 Virus (Coronavirus) by PCR Nasopharyngeal    Collection Time: 08/23/21  6:48 PM    Specimen: Nasopharyngeal; Swab   Result Value Ref Range    SARS CoV2 PCR Negative  Negative            Consults:   Consultation during this admission received from internal medicine         Hospital Course:   Hi Do was admitted to [facility] with attending Emely MOE CNP, as a voluntary patient. The patient was placed under status 15 (15 minute checks) to ensure patient safety.     The following medication changes took place:   --Cymbalta 60 mg every morning  --Gabapentin 600 mg twice a day  --Seroquel 200 mg at bedtime  --Seroquel 25 mg twice a day    The patient tolerated medications well. Reported mood symptoms improved.  Initially, the patient was isolated to his room however, a week into the hospitalization, he started feeling better and went to all of the groups.  Staff splitting was evident in the beginning of the hospitalization, later this behavior disappeared and he he was very calm, pleasant, and cooperative.  The patient was active on the unit. The patient was social, engaged and attended groups. No overt jammie, confusion or psychosis noted.  Reported resolution auditory hallucinations.  The patient maintained denial of SI, HI and DEX. The patient reported minimal depression and anxiety.  Denied suicidal ideation.  Future oriented, feeling hopeful for the future. The patient slept well. Appetite was intact. The patient was compliant with medications and care.     Hi Do was released to crisis bed, where he will wait for IRTS bed opening. . At the time of this encounter, Hi Do was determined to not be a danger to himself or others and symptoms did not meet criteria for involuntary hospitalization.      Safety plan, post discharge recommendations and relapse prevention were discussed with the patient. The patient agreed to call 911 or present to ED if symptoms worsen or developed thoughts of suicide, self harm or homicide.  The patient agreed to continue medications and outpatient care.         Discharge Medications:     Current Discharge  Medication List      START taking these medications    Details   ibuprofen (ADVIL/MOTRIN) 600 MG tablet Take 1 tablet (600 mg) by mouth every 6 hours as needed for moderate pain  Qty: 90 tablet, Refills: 0    Associated Diagnoses: Pain      Lidocaine (LIDOCARE) 4 % Patch Place 1-3 patches onto the skin every 24 hours To prevent lidocaine toxicity, patient should be patch free for 12 hrs daily.  Qty: 90 patch, Refills: 0    Associated Diagnoses: Pain      OLANZapine (ZYPREXA) 10 MG tablet Take 1 tablet (10 mg) by mouth 3 times daily as needed (associated with psychosis or jammie)  Qty: 60 tablet, Refills: 0    Associated Diagnoses: Schizoaffective disorder, chronic condition with acute exacerbation (H)      traZODone (DESYREL) 50 MG tablet Take 1 tablet (50 mg) by mouth nightly as needed for sleep (may repeat after 60 minutes)  Qty: 30 tablet, Refills: 0    Associated Diagnoses: Schizoaffective disorder, chronic condition with acute exacerbation (H)         CONTINUE these medications which have CHANGED    Details   aspirin (ASA) 81 MG chewable tablet Take 1 tablet (81 mg) by mouth daily  Qty: 30 tablet, Refills: 0    Associated Diagnoses: CVD (cardiovascular disease)      atorvastatin (LIPITOR) 20 MG tablet Take 1 tablet (20 mg) by mouth daily  Qty: 30 tablet, Refills: 0    Associated Diagnoses: CVD (cardiovascular disease)      DULoxetine (CYMBALTA) 60 MG capsule Take 1 capsule (60 mg) by mouth every morning  Qty: 30 capsule, Refills: 0    Associated Diagnoses: Schizoaffective disorder, chronic condition with acute exacerbation (H)      gabapentin (NEURONTIN) 600 MG tablet Take 1 tablet (600 mg) by mouth 2 times daily  Qty: 60 tablet, Refills: 0    Associated Diagnoses: Pain      !! QUEtiapine (SEROQUEL) 200 MG tablet Take 1 tablet (200 mg) by mouth At Bedtime  Qty: 30 tablet, Refills: 0    Associated Diagnoses: Schizoaffective disorder, chronic condition with acute exacerbation (H)      !! QUEtiapine (SEROQUEL) 25  MG tablet Take 1 tablet (25 mg) by mouth 2 times daily  Qty: 60 tablet, Refills: 0    Associated Diagnoses: Schizoaffective disorder, chronic condition with acute exacerbation (H)       !! - Potential duplicate medications found. Please discuss with provider.      CONTINUE these medications which have NOT CHANGED    Details   Melatonin 10 MG TABS tablet Take 10 mg by mouth nightly as needed for sleep      acetaminophen (TYLENOL) 325 MG tablet Take 325-650 mg by mouth every 6 hours as needed for mild pain                  Psychiatric and Physical Examinations:   Appearance:  well groomed, alert, cooperative and no apparent distress  Attitude:  cooperative  Eye Contact:  good  Mood:  good  Affect:  appropriate and in normal range  Speech:  clear, coherent  Psychomotor Behavior:  no evidence of tardive dyskinesia, dystonia, or tics  Thought Process:  logical and goal oriented  Associations:  no loose associations  Thought Content:  no evidence of suicidal ideation or homicidal ideation  Insight:  good  Judgment:  intact  Oriented to:  time, person, and place  Attention Span and Concentration:  intact  Recent and Remote Memory:  intact  Language and Fund of Knowledge: appropriate  Muscle Strength and Tone: normal  Gait and Station: Normal and some limping noted due to hip degeneration  Vitals:    08/26/21 0900 08/26/21 1628 08/27/21 0828 08/27/21 1248   BP: 112/80 120/85 112/80    Pulse: 90 71 85    Resp: 16 16     Temp: (!) 96.3  F (35.7  C) 97.8  F (36.6  C) (!) 96  F (35.6  C) 97.3  F (36.3  C)   TempSrc: Temporal Temporal Temporal Tympanic   SpO2: 98%  97%    Weight: 106.4 kg (234 lb 8 oz)      Height:                Discharge Plan:     [facility]   Intake 8/27/2021 at 4 pm  Address: [address] [place] [address]  Phone: [phone]     [facility]: [facility] [place].   : Sam ([phone] or  [phone])  Intake Appointment: Monday 8/30/2021 at 10 am.   Address: [address] [place] [address]  Phone: [phone]     Psychiatry: [facility]  Provider: Angelo Jewell  In person Appointment: September 2nd, 2021 at 10 am   Address: [address] [place], [place] [id number]  Phone: [phone]     Individual therapy: [facility]  Individual Therapist: Matty Lombardo, PhD, LP  In person appointment: Sempteber 6, 2021 at 9 am.  Address: [address] [place], [place] [id number]  Phone: [phone]         Attestation:  The patient has been seen and evaluated by me,  Emely MOE, CNP

## 2021-08-27 NOTE — DISCHARGE INSTRUCTIONS
Behavioral Discharge Planning and Instructions    Summary: You were admitted on 8/12/2021  due to Depression, Anxiety, Psychotic Symptomology and Suicidal Ideations.  You were treated by PAYAL Moses and discharged on 8/27/2021 from [facility] to [facility]    Main Diagnosis:   1.  Major depressive disorder, recurrent, severe, with anxious distress and psychosis  2.  Generalized anxiety disorder  3.  PTSD  4.  Cocaine use disorder    Health Care Follow-up:     [facility]   Intake 8/27/2021 at 4 pm  Address: [address] [place], [place] [id number]  Phone: [phone]    IRTS: [facility] [place].   : Sam ([phone] or [phone])  Intake Appointment: Monday 8/30/2021 at 10 am.   Address: [address] [place] [address]  Phone: [phone]    Psychiatry: [facility]  Provider: Angelo Jewell  In person Appointment: September 2nd, 2021 at 10 am   Address: [address] [place] [address]  Phone: [phone]    Individual therapy: [facility]  Individual Therapist: Matty Lombardo, PhD, LP  In person appointment: Sempteber 6, 2021 at 9 am.  Address: [address] [place] [address]  Phone: [phone]    Your Psychiatry and individual Therapy appointments are both in person. You will need to Schedule Medical Cab rides through your insurance. Please call [employer] at [phone] at least 2 days before your appointment to schedule a ride.     Attend all scheduled appointments with your outpatient providers. Call at least 24 hours in advance if you need to reschedule an appointment to ensure continued access to your outpatient providers.     Major Treatments, Procedures and Findings:  You were provided with: a psychiatric assessment, assessed for medical stability, medication evaluation and/or management, group therapy and  "milieu management    Symptoms to Report: feeling more aggressive, increased confusion, losing more sleep, mood getting worse or thoughts of suicide    Early warning signs can include: increased depression or anxiety sleep disturbances increased thoughts or behaviors of suicide or self-harm  increased unusual thinking, such as paranoia or hearing voices    Safety and Wellness:  Take all medicines as directed.  Make no changes unless your doctor suggests them.      Follow treatment recommendations.  Refrain from alcohol and non-prescribed drugs.  Ask your support system to help you reduce your access to items that could harm yourself or others. Items could include:  Firearms  Medicines (both prescribed and over-the-counter)  Knives and other sharp objects  Ropes and like materials  Car keys  If there is a concern for safety, call 911. If there is a concern for safety, call 911.    Resources:   Crisis Intervention: 341.640.2661 or 833-910-9151 (TTY: 384.920.5605).  Call anytime for help.  National [place] on Mental Illness (www.[place].yee.org): 294.907.2500 or 583-045-1101.  [facility] Crisis (COPE) Response - Adult 054 574-5081  Text 4 Life: txt \"LIFE\" to 80423 for immediate support and crisis intervention  Crisis text line: Text \"[place]\" to 389615. Free, confidential, 24/7.  Crisis Intervention: 754.116.8654 or 304-155-4278. Call anytime for help.     General Medication Instructions:   See your medication sheet(s) for instructions.   Take all medicines as directed.  Make no changes unless your doctor suggests them.   Go to all your doctor visits.  Be sure to have all your required lab tests. This way, your medicines can be refilled on time.  Do not use any drugs not prescribed by your doctor.  Avoid alcohol.    Advance Directives:   Scanned document on file with [employer]? No scanned doc  Is document scanned? Pt states no documents  Honoring Choices Your Rights Handout: Informed and given  Was more information offered? " Pt declined    The Treatment team has appreciated the opportunity to work with you. If you have any questions or concerns about your recent admission, you can contact the unit which can receive your call 24 hours a day, 7 days a week. They will be able to get in touch with a Provider if needed. The unit number is [phone] .

## 2021-08-27 NOTE — PLAN OF CARE
"Patient alert and oriented x4. Denies pain.   Affect full range. Mood calm and cooperative. Denies SI/SIB/Hallucinations. Denies anxiety and depression. \"I feel fine\".   [facility] to [facility] AVS relieved with patient, copy in the chart, questions answered, pt. Verbalized understanding.   All belongings checked and sent with patient. Home medications sent with patient. Available pharmacy medications sent with patient. Patient refused pharmacy to refill medications he had in his locker from before admission, stated that prefers to be discharge earlier than to miah the pharmacy to send them since he already has his own supply available.   Patient was escorted out of the unit by staff member.  Transport was arranged.   "

## 2024-10-23 ENCOUNTER — TELEPHONE (OUTPATIENT)
Dept: BEHAVIORAL HEALTH | Facility: CLINIC | Age: 59
End: 2024-10-23
Payer: COMMERCIAL

## 2024-10-23 ENCOUNTER — HOSPITAL ENCOUNTER (INPATIENT)
Facility: CLINIC | Age: 59
LOS: 6 days | Discharge: HOME | End: 2024-10-30
Attending: STUDENT IN AN ORGANIZED HEALTH CARE EDUCATION/TRAINING PROGRAM | Admitting: PSYCHIATRY & NEUROLOGY
Payer: COMMERCIAL

## 2024-10-23 DIAGNOSIS — F41.9 ANXIETY: ICD-10-CM

## 2024-10-23 DIAGNOSIS — R44.0 AUDITORY HALLUCINATION: ICD-10-CM

## 2024-10-23 DIAGNOSIS — R45.851 SUICIDAL IDEATION: ICD-10-CM

## 2024-10-23 DIAGNOSIS — G89.29 CHRONIC PAIN OF LEFT KNEE: Primary | ICD-10-CM

## 2024-10-23 DIAGNOSIS — F29 PSYCHOSIS, UNSPECIFIED PSYCHOSIS TYPE (H): ICD-10-CM

## 2024-10-23 DIAGNOSIS — M25.562 CHRONIC PAIN OF LEFT KNEE: Primary | ICD-10-CM

## 2024-10-23 DIAGNOSIS — F43.10 PTSD (POST-TRAUMATIC STRESS DISORDER): Chronic | ICD-10-CM

## 2024-10-23 DIAGNOSIS — F25.9 SCHIZOAFFECTIVE DISORDER, CHRONIC CONDITION WITH ACUTE EXACERBATION (H): Chronic | ICD-10-CM

## 2024-10-23 LAB
AMPHETAMINES UR QL SCN: NORMAL
BARBITURATES UR QL SCN: NORMAL
BENZODIAZ UR QL SCN: NORMAL
BZE UR QL SCN: NORMAL
CANNABINOIDS UR QL SCN: NORMAL
FENTANYL UR QL: NORMAL
OPIATES UR QL SCN: NORMAL
PCP QUAL URINE (ROCHE): NORMAL

## 2024-10-23 PROCEDURE — 99285 EMERGENCY DEPT VISIT HI MDM: CPT | Performed by: STUDENT IN AN ORGANIZED HEALTH CARE EDUCATION/TRAINING PROGRAM

## 2024-10-23 PROCEDURE — 80307 DRUG TEST PRSMV CHEM ANLYZR: CPT | Performed by: STUDENT IN AN ORGANIZED HEALTH CARE EDUCATION/TRAINING PROGRAM

## 2024-10-23 PROCEDURE — 87635 SARS-COV-2 COVID-19 AMP PRB: CPT | Performed by: STUDENT IN AN ORGANIZED HEALTH CARE EDUCATION/TRAINING PROGRAM

## 2024-10-23 PROCEDURE — 250N000013 HC RX MED GY IP 250 OP 250 PS 637: Performed by: STUDENT IN AN ORGANIZED HEALTH CARE EDUCATION/TRAINING PROGRAM

## 2024-10-23 RX ORDER — HYDROXYZINE HYDROCHLORIDE 25 MG/1
25 TABLET, FILM COATED ORAL EVERY 4 HOURS PRN
Status: DISCONTINUED | OUTPATIENT
Start: 2024-10-23 | End: 2024-10-24

## 2024-10-23 RX ORDER — OLANZAPINE 10 MG/1
10 TABLET, ORALLY DISINTEGRATING ORAL ONCE
Status: COMPLETED | OUTPATIENT
Start: 2024-10-23 | End: 2024-10-23

## 2024-10-23 RX ORDER — ACETAMINOPHEN 325 MG/1
650 TABLET ORAL EVERY 4 HOURS PRN
Status: DISCONTINUED | OUTPATIENT
Start: 2024-10-23 | End: 2024-10-24

## 2024-10-23 RX ORDER — OLANZAPINE 10 MG/1
10 TABLET, ORALLY DISINTEGRATING ORAL 2 TIMES DAILY PRN
Status: DISCONTINUED | OUTPATIENT
Start: 2024-10-23 | End: 2024-10-24 | Stop reason: ALTCHOICE

## 2024-10-23 RX ADMIN — OLANZAPINE 10 MG: 10 TABLET, ORALLY DISINTEGRATING ORAL at 21:21

## 2024-10-23 ASSESSMENT — COLUMBIA-SUICIDE SEVERITY RATING SCALE - C-SSRS
2. HAVE YOU ACTUALLY HAD ANY THOUGHTS OF KILLING YOURSELF IN THE PAST MONTH?: YES
1. IN THE PAST MONTH, HAVE YOU WISHED YOU WERE DEAD OR WISHED YOU COULD GO TO SLEEP AND NOT WAKE UP?: YES
6. HAVE YOU EVER DONE ANYTHING, STARTED TO DO ANYTHING, OR PREPARED TO DO ANYTHING TO END YOUR LIFE?: YES
5. HAVE YOU STARTED TO WORK OUT OR WORKED OUT THE DETAILS OF HOW TO KILL YOURSELF? DO YOU INTEND TO CARRY OUT THIS PLAN?: NO
BASED ON RESPONSES TO C-SSRS QS 1-6, WHAT IS THE PATIENT'S OVERALL RISK RATING FOR SUICIDE: HIGH RISK
3. HAVE YOU BEEN THINKING ABOUT HOW YOU MIGHT KILL YOURSELF?: YES
4. HAVE YOU HAD THESE THOUGHTS AND HAD SOME INTENTION OF ACTING ON THEM?: YES

## 2024-10-23 ASSESSMENT — ACTIVITIES OF DAILY LIVING (ADL)
ADLS_ACUITY_SCORE: 0

## 2024-10-24 ENCOUNTER — TELEPHONE (OUTPATIENT)
Dept: BEHAVIORAL HEALTH | Facility: CLINIC | Age: 59
End: 2024-10-24
Payer: COMMERCIAL

## 2024-10-24 PROBLEM — F33.2 MAJOR DEPRESSIVE DISORDER, RECURRENT SEVERE WITHOUT PSYCHOTIC FEATURES (H): Status: ACTIVE | Noted: 2024-10-24

## 2024-10-24 LAB — SARS-COV-2 RNA RESP QL NAA+PROBE: NEGATIVE

## 2024-10-24 PROCEDURE — 250N000013 HC RX MED GY IP 250 OP 250 PS 637: Performed by: PHYSICIAN ASSISTANT

## 2024-10-24 PROCEDURE — 250N000013 HC RX MED GY IP 250 OP 250 PS 637: Performed by: PSYCHIATRY & NEUROLOGY

## 2024-10-24 PROCEDURE — 99223 1ST HOSP IP/OBS HIGH 75: CPT | Performed by: PSYCHIATRY & NEUROLOGY

## 2024-10-24 PROCEDURE — 124N000002 HC R&B MH UMMC

## 2024-10-24 RX ORDER — ATORVASTATIN CALCIUM 40 MG/1
40 TABLET, FILM COATED ORAL DAILY
COMMUNITY

## 2024-10-24 RX ORDER — ATORVASTATIN CALCIUM 40 MG/1
40 TABLET, FILM COATED ORAL DAILY
Status: DISCONTINUED | OUTPATIENT
Start: 2024-10-24 | End: 2024-10-30 | Stop reason: HOSPADM

## 2024-10-24 RX ORDER — QUETIAPINE FUMARATE 25 MG/1
25 TABLET, FILM COATED ORAL 2 TIMES DAILY
Status: DISCONTINUED | OUTPATIENT
Start: 2024-10-24 | End: 2024-10-30 | Stop reason: HOSPADM

## 2024-10-24 RX ORDER — AMOXICILLIN 250 MG
1 CAPSULE ORAL 2 TIMES DAILY PRN
Status: DISCONTINUED | OUTPATIENT
Start: 2024-10-24 | End: 2024-10-30 | Stop reason: HOSPADM

## 2024-10-24 RX ORDER — HYDROXYZINE HYDROCHLORIDE 25 MG/1
25 TABLET, FILM COATED ORAL EVERY 4 HOURS PRN
Status: DISCONTINUED | OUTPATIENT
Start: 2024-10-24 | End: 2024-10-30 | Stop reason: HOSPADM

## 2024-10-24 RX ORDER — DULOXETIN HYDROCHLORIDE 60 MG/1
60 CAPSULE, DELAYED RELEASE ORAL DAILY
Status: ON HOLD | COMMUNITY
End: 2024-10-25

## 2024-10-24 RX ORDER — DEXTROSE MONOHYDRATE 25 G/50ML
25-50 INJECTION, SOLUTION INTRAVENOUS
Status: DISCONTINUED | OUTPATIENT
Start: 2024-10-24 | End: 2024-10-30 | Stop reason: HOSPADM

## 2024-10-24 RX ORDER — OLANZAPINE 10 MG/2ML
10 INJECTION, POWDER, FOR SOLUTION INTRAMUSCULAR 3 TIMES DAILY PRN
Status: DISCONTINUED | OUTPATIENT
Start: 2024-10-24 | End: 2024-10-30 | Stop reason: HOSPADM

## 2024-10-24 RX ORDER — PANTOPRAZOLE SODIUM 40 MG/1
40 TABLET, DELAYED RELEASE ORAL DAILY
Status: DISCONTINUED | OUTPATIENT
Start: 2024-10-24 | End: 2024-10-30 | Stop reason: HOSPADM

## 2024-10-24 RX ORDER — ALBUTEROL SULFATE 90 UG/1
2 INHALANT RESPIRATORY (INHALATION) EVERY 6 HOURS PRN
COMMUNITY

## 2024-10-24 RX ORDER — MAGNESIUM HYDROXIDE/ALUMINUM HYDROXICE/SIMETHICONE 120; 1200; 1200 MG/30ML; MG/30ML; MG/30ML
30 SUSPENSION ORAL EVERY 4 HOURS PRN
Status: DISCONTINUED | OUTPATIENT
Start: 2024-10-24 | End: 2024-10-30 | Stop reason: HOSPADM

## 2024-10-24 RX ORDER — GLIPIZIDE 5 MG/1
5 TABLET ORAL DAILY
COMMUNITY

## 2024-10-24 RX ORDER — TRAZODONE HYDROCHLORIDE 50 MG/1
50 TABLET, FILM COATED ORAL
Status: DISCONTINUED | OUTPATIENT
Start: 2024-10-24 | End: 2024-10-30 | Stop reason: HOSPADM

## 2024-10-24 RX ORDER — ALBUTEROL SULFATE 90 UG/1
2 INHALANT RESPIRATORY (INHALATION) EVERY 6 HOURS PRN
Status: DISCONTINUED | OUTPATIENT
Start: 2024-10-24 | End: 2024-10-30 | Stop reason: HOSPADM

## 2024-10-24 RX ORDER — GLIPIZIDE 5 MG/1
5 TABLET ORAL DAILY
Status: DISCONTINUED | OUTPATIENT
Start: 2024-10-24 | End: 2024-10-30 | Stop reason: HOSPADM

## 2024-10-24 RX ORDER — METFORMIN HYDROCHLORIDE 500 MG/1
500 TABLET, EXTENDED RELEASE ORAL 2 TIMES DAILY WITH MEALS
COMMUNITY

## 2024-10-24 RX ORDER — NICOTINE POLACRILEX 4 MG
15-30 LOZENGE BUCCAL
Status: DISCONTINUED | OUTPATIENT
Start: 2024-10-24 | End: 2024-10-30 | Stop reason: HOSPADM

## 2024-10-24 RX ORDER — OLANZAPINE 10 MG/1
10 TABLET ORAL 3 TIMES DAILY PRN
Status: DISCONTINUED | OUTPATIENT
Start: 2024-10-24 | End: 2024-10-30 | Stop reason: HOSPADM

## 2024-10-24 RX ORDER — OMEPRAZOLE 40 MG/1
40 CAPSULE, DELAYED RELEASE ORAL DAILY
Status: ON HOLD | COMMUNITY
End: 2024-10-24

## 2024-10-24 RX ORDER — METFORMIN HYDROCHLORIDE 500 MG/1
500 TABLET, EXTENDED RELEASE ORAL
Status: DISCONTINUED | OUTPATIENT
Start: 2024-10-24 | End: 2024-10-25

## 2024-10-24 RX ORDER — PIOGLITAZONE 30 MG/1
30 TABLET ORAL DAILY
Status: ON HOLD | COMMUNITY
End: 2024-10-24

## 2024-10-24 RX ORDER — ACETAMINOPHEN 325 MG/1
650 TABLET ORAL EVERY 4 HOURS PRN
Status: DISCONTINUED | OUTPATIENT
Start: 2024-10-24 | End: 2024-10-24

## 2024-10-24 RX ORDER — PANTOPRAZOLE SODIUM 40 MG/1
40 TABLET, DELAYED RELEASE ORAL DAILY
COMMUNITY

## 2024-10-24 RX ORDER — ACETAMINOPHEN 325 MG/1
325-650 TABLET ORAL EVERY 6 HOURS PRN
Status: DISCONTINUED | OUTPATIENT
Start: 2024-10-24 | End: 2024-10-30 | Stop reason: HOSPADM

## 2024-10-24 RX ADMIN — DICLOFENAC SODIUM TOPICAL GEL, 1% 2 G: 10 GEL TOPICAL at 14:03

## 2024-10-24 RX ADMIN — GLIPIZIDE 5 MG: 5 TABLET ORAL at 14:02

## 2024-10-24 RX ADMIN — PANTOPRAZOLE SODIUM 40 MG: 40 TABLET, DELAYED RELEASE ORAL at 13:33

## 2024-10-24 RX ADMIN — QUETIAPINE FUMARATE 25 MG: 25 TABLET ORAL at 12:16

## 2024-10-24 RX ADMIN — ATORVASTATIN CALCIUM 40 MG: 40 TABLET, FILM COATED ORAL at 13:33

## 2024-10-24 RX ADMIN — METFORMIN HYDROCHLORIDE 500 MG: 500 TABLET, EXTENDED RELEASE ORAL at 18:00

## 2024-10-24 RX ADMIN — QUETIAPINE FUMARATE 25 MG: 25 TABLET ORAL at 21:43

## 2024-10-24 ASSESSMENT — ACTIVITIES OF DAILY LIVING (ADL)
ADLS_ACUITY_SCORE: 0
ADLS_ACUITY_SCORE: 0
LAUNDRY: WITH SUPERVISION
ADLS_ACUITY_SCORE: 0
HYGIENE/GROOMING: INDEPENDENT;SHOWER
ADLS_ACUITY_SCORE: 0
DRESS: INDEPENDENT
ADLS_ACUITY_SCORE: 0
LAUNDRY: WITH SUPERVISION
ORAL_HYGIENE: INDEPENDENT
ADLS_ACUITY_SCORE: 0
HYGIENE/GROOMING: INDEPENDENT
ADLS_ACUITY_SCORE: 0
DRESS: STREET CLOTHES;INDEPENDENT
ADLS_ACUITY_SCORE: 0
ORAL_HYGIENE: INDEPENDENT
ADLS_ACUITY_SCORE: 0

## 2024-10-24 NOTE — PLAN OF CARE
Goal Outcome Evaluation:    Pt was isolative and withdrawn his room resting in bed at the beginning of the shift. Pt came out and grabbed his meal tray and at in his room. Pt was observed to be not social with staff and peers. Pt presented as tense, irritable, guarded. Pt did not attend any group this shift said he might try tomorrow. Pt continues to endorse high anxiety and depression rated 8/10 as well as suicidal thoughts. Pt contracted for safety while in the unit. Pt showered this shift. Pt continues to c/o bilateral knee pain rated 8/10. Pt was medication compliant this shift and denied any side effects. Pt showered this shift. Staff will continue to monitor and support with current POC.    Plan of Care Reviewed With: patient

## 2024-10-24 NOTE — PLAN OF CARE
Problem: Adult Behavioral Health Plan of Care  Goal: Develops/Participates in Therapeutic [place] to Support Successful Transition  Outcome: Progressing     Problem: Depressive Signs/Symptoms  Goal: Improved Mood Symptoms (Depressive Signs/Symptoms)  Outcome: Progressing   Goal Outcome Evaluation:     Jonathon was admitted on the PM shift yesterday, he is on suicide precautions. Not very cooperative on the PM shift. Patient was also angry on the night shift because of 15 minutes checks. Patient woke up from bed around 10:30 AM, alert and oriented x 4, vitals were stable.    Patient has been very calmed and cooperative with cares. Flat affect denied SI,SIB, but feeling a little depressed because of his children and anxiety because of being in a new environment.    Glipizide and metformin were found in patient's belongings were sent to pharmacy. Most of patient's medications were ordered this afternoon.     Patient is a diabetic, but he is not on insulin. Has an order for  PRN blood sugar check. Offered to check patient's blood sugar patient refused.    Patient spent majority of the shift in his room he did come out for lunch and breakfast, and ate well. Patient complained of knee pain,  diclofenac gel was ordered. Medication compliant no concern with behavior on this shift.

## 2024-10-24 NOTE — ED PROVIDER NOTES
[facility] EMERGENCY DEPARTMENT ([place])    10/23/24      ED PROVIDER NOTE       History     Chief Complaint   Patient presents with    Suicidal     Was picked up at the [facility] institution has been out of meds (cymbalta, seroquel) stressed about the voices. + AH to kill self. Hx: SA.    Hallucinations     HPI  Hi Do is a 59 year old male with a history of substance abuse, psychosis and schizoaffective disorder  who presents to the emergency department for suicidal ideation and hallucinations.    Per chart review: The patient was seen in the emergency department on 10/12 with suicidal ideation with a plan to cut his wrist. It is also noted that he does not feel comfortable where he was living. A  evaluated the patient and recommended observation in the ED overnight. The patient was accepted into crisis stabilization housing and discharged on 10/13.       Patient notes that he is been having increasing auditory hallucinations with commands to kill himself.  They have been worsening over the last 6 months, but have been telling him to kill himself over the last week.  He feels that they are impossible to ignore at this point, and is very concerned that he is going to harm himself.  He also states that he agrees with the voices and feels like he might as well just kill himself although he has no specific plan.  Has been off of all of his psychiatric medications for the last year    Past Medical History  Past Medical History:   Diagnosis Date    PTSD (post-traumatic stress disorder)     Schizophrenia (H)      No past surgical history on file.  acetaminophen (TYLENOL) 325 MG tablet  atorvastatin (LIPITOR) 20 MG tablet  DULoxetine (CYMBALTA) 60 MG capsule  gabapentin (NEURONTIN) 600 MG tablet  Melatonin 10 MG TABS tablet  OLANZapine (ZYPREXA) 10 MG tablet  QUEtiapine (SEROQUEL) 25 MG tablet  traZODone (DESYREL) 50 MG tablet      No Known Allergies  Family History  No family  "history on file.  Social History   Social History     Tobacco Use    Smoking status: Every Day     Current packs/day: 0.25     Types: Cigarettes    Smokeless tobacco: Never   Substance Use Topics    Alcohol use: Not Currently    Drug use: Not Currently      A complete review of systems was performed with pertinent positives and negatives noted in the HPI, and all other systems negative.    Physical Exam   BP: (!) 161/105  Pulse: 117  Temp: 98.2  F (36.8  C)  Resp: 16  Height: 175.3 cm (5' 9\")  Weight: 106.6 kg (235 lb)  SpO2: 96 %  Physical Exam  Vitals and nursing note reviewed.       GEN: Well appearing, non toxic, cooperative, slightly tearful  HEENT: normocephalic and atraumatic, PERRLA, EOMI  CV: well-perfused, normal skin color for ethnicity, sinus tachycardia  PULM: breathing comfortably, in no respiratory distress  ABD: nondistended  EXT: Full range of motion.  No edema.  NEURO: awake, conversant, grossly normal bilateral upper and lower extremity strength & ROM   SKIN: No rashes, ecchymosis, or lacerations  PSYCH: Calm and cooperative, interactive, endorses auditory hallucinations    ED Course, Procedures, & Data      Procedures          Results for orders placed or performed during the hospital encounter of 10/23/24   Urine Drug Screen Panel     Status: Normal   Result Value Ref Range    Amphetamines Urine Screen Negative Screen Negative    Barbituates Urine Screen Negative Screen Negative    Benzodiazepine Urine Screen Negative Screen Negative    Cannabinoids Urine Screen Negative Screen Negative    Cocaine Urine Screen Negative Screen Negative    Fentanyl Qual Urine Screen Negative Screen Negative    Opiates Urine Screen Negative Screen Negative    PCP Urine Screen Negative Screen Negative   Urine Drug Screen     Status: Normal    Narrative    The following orders were created for panel order Urine Drug Screen.  Procedure                               Abnormality         Status                   "   ---------                               -----------         ------                     Urine Drug Screen Panel[[id number]]      Normal              Final result                 Please view results for these tests on the individual orders.     Medications   acetaminophen (TYLENOL) tablet 650 mg (has no administration in time range)   hydrOXYzine HCl (ATARAX) tablet 25 mg (has no administration in time range)   OLANZapine zydis (zyPREXA) ODT tab 10 mg (has no administration in time range)   OLANZapine zydis (zyPREXA) ODT tab 10 mg (10 mg Oral $Given 10/23/24 6807)     Labs Ordered and Resulted from Time of ED Arrival to Time of ED Departure   URINE DRUG SCREEN PANEL - Normal       Result Value    Amphetamines Urine Screen Negative      Barbituates Urine Screen Negative      Benzodiazepine Urine Screen Negative      Cannabinoids Urine Screen Negative      Cocaine Urine Screen Negative      Fentanyl Qual Urine Screen Negative      Opiates Urine Screen Negative      PCP Urine Screen Negative     COVID-19 VIRUS (CORONAVIRUS) BY PCR     No orders to display          Critical care was not performed.     Medical Decision Making  The patient's presentation was of high complexity (a chronic illness severe exacerbation, progression, or side effect of treatment).    The patient's evaluation involved:  review of external note(s) from 3+ sources (see separate area of note for details)  review of 3+ test result(s) ordered prior to this encounter (see separate area of note for details)  discussion of management or test interpretation with another health professional (see separate area of note for details)    The patient's management necessitated high risk (a decision regarding hospitalization).    Assessment & Plan    59-year-old male with past medical history of polysubstance abuse, schizoaffective disorder and psychosis presents emergency department due to auditory hallucinations worsening over the last week with commands to kill  himself since then endorsing increasing depression and thoughts of wanting to harm himself/suicide without a specific plan at this time.    Accepting of Zyprexa at this point.  He is currently pending DEC evaluation    12:04 AM evaluated by DEC who is in agreement to make him an inpatient mental health bed.  ED border care set was ordered.    I have reviewed the nursing notes. I have reviewed the findings, diagnosis, plan and need for follow up with the patient.    New Prescriptions    No medications on file       Final diagnoses:   Suicidal ideation   Auditory hallucination       Angie Medina MD  [facility] EMERGENCY DEPARTMENT  10/23/2024     Angie Medina MD  10/24/24 0005
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
## 2024-10-24 NOTE — PLAN OF CARE
10/24/24 1050   Patient Belongings   Did you bring any home meds/supplements to the hospital?  Yes   Disposition of meds  Sent to security/pharmacy per site process   Patient Belongings other (see comments)   Patient Belongings Put in Hospital Secure Location (Security or Locker, etc.) other (see comments)   Belongings Search Yes   Clothing Search Yes   Second Staff Dov MA       In locker #11:  Timberwolves hat  Black shoes  Alberto simmons  Bears sweatshirt  Belt   Cell phone  [place] ID  [facility] Card    Unsearched black suitcase underneath lockers    A               Admission:  I am responsible for any personal items that are not sent to the safe or pharmacy.  [facility] is not responsible for loss, theft or damage of any property in my possession.    Signature:  _________________________________ Date: _______  Time: _____                                              Staff Signature:  ____________________________ Date: ________  Time: _____      2nd Staff person, if patient is unable/unwilling to sign:    Signature: ________________________________ Date: ________  Time: _____     Discharge:  [facility] has returned all of my personal belongings:    Signature: _________________________________ Date: ________  Time: _____                                          Staff Signature:  ____________________________ Date: ________  Time: _____

## 2024-10-24 NOTE — CONSULTS
Brief Internal Medicine Consult Note    Internal medicine was consulted for chronic left knee pain.  Upon chart review, patient carries a diagnosis of left knee osteoarthritis.  He was seen in ED on 10/12/2024 for his left knee pain.  At that time he had lab work and imaging obtained, imaging included left knee x-ray and left lower extremity ultrasound.  No acute causes of pain were identified during this visit and he was discharged with instructions to take ibuprofen and Tylenol.     Discussed with psychiatry, no indication for internal medicine re-evaluation at this time for ongoing chronic left knee pain without acute change. Agree with acetaminophen and ibuprofen as ordered. I will add voltaren gel scheduled. I will also add orthopedics referral at discharge.       Please consult or contact us with any new medical questions or concerns.       Lidia Alcocer PA-C  [facility]  10/24/2024 1:19 PM

## 2024-10-24 NOTE — ED NOTES
IP MH Referral Acuity Rating Score (RARS)    LMHP complete at referral to IP MH, with DEC; and, daily while awaiting IP MH placement. Call score to PPS.  CRITERIA SCORING   New 70 HH and Involuntary for IP MH (not adolescent) 0/1   Boarding over 24 hours 0/1   Vulnerable adult at least 55+ with multiple co morbidities; or, Patient age 11 or under 1/1   Suicide ideation without relief of precipitating factors 1/1   Current plan for suicide 1/1   Current plan for homicide 0/1   Imminent risk or actual attempt to seriously harm another without relief of factors precipitating the attempt 0/1   Severe dysfunction in daily living (ex: complete neglect for self care, extreme disruption in vegetative function, extreme deterioration in social interactions) 0/1   Recent (last 2 weeks) or current physical aggression in the ED 0/1   Restraints or seclusion episode in ED 0/1   Verbal aggression, agitation, yelling, etc., while in the ED 0/1   Active psychosis with psychomotor agitation or catatonia 1/1   Need for constant or near constant redirection (from leaving, from others, etc).  0/1   Intrusive or disruptive behaviors 1/1   TOTAL Acuity Total Score: 5       KOURTNEY Chamorro, LGSW  Psychotherapist Trainee  DEC   [facility]  [email or web address]
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
## 2024-10-24 NOTE — PLAN OF CARE
Goal Outcome Evaluation:     10/24/24 9805   Patient Belongings   Did you bring any home meds/supplements to the hospital?  Yes   Patient Belongings remains with patient;locker   Patient Belongings Remaining with Patient clothing   Patient Belongings Put in Hospital Secure Location (Security or Locker, etc.) plastic bag;shoes;suitcase;cell phone/electronics   Belongings Search Yes   Clothing Search Yes   Comment Preparer     In Patient's locker #11  X1 cell phone   X1 jeans jacket   X1 black shoes  X1 belt   X1 blue hat   X1 blue & orange hoodie   X1 [facility]  bus card    [place] ID card   Black unsearched suitcase     A               Admission:  I am responsible for any personal items that are not sent to the safe or pharmacy.  [facility] is not responsible for loss, theft or damage of any property in my possession.    Signature:  _________________________________ Date: _______  Time: _____                                              Staff Signature:  ____________________________ Date: ________  Time: _____      2nd Staff person, if patient is unable/unwilling to sign:    Signature: ________________________________ Date: ________  Time: _____     Discharge:  [facility] has returned all of my personal belongings:    Signature: _________________________________ Date: ________  Time: _____                                          Staff Signature:  ____________________________ Date: ________  Time: _____
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
## 2024-10-24 NOTE — ED NOTES
2:45 am  received report on this patient that coming from adult ED.Patient received on the unit in W/C and observed with even gait and balance and clear speech. Denies any SI/HI/ SIB at this time. All personal property has been locked up one bag and I suitcase. Patient requested sandwich and something to drink. I went over bathroom and unit meal times and unit rules. Patient was searched he has no phone or strings on any clothes. He can request his needs and voice his needs.Cooperative ands interacts well with staff, Plans is to go to station # 30 they will call for report.3:37 am I gave report to uma on station # [id number].

## 2024-10-24 NOTE — PLAN OF CARE
10/24/24 1555   Individualization/Patient Specific Goals   Patient Personal Strengths positive vocational history;positive educational history;independent living skills   Patient Vulnerabilities substance abuse/addiction;housing insecurity;history of unsuccessful treatment   Anxieties, Fears or Concerns worried about housing   Individualized Care Needs thorough evaluation   Patient/Family-Specific Goals (Include Timeframe) resolve auditory hallucinations   Interprofessional Rounds   Summary Pt  presented to the ER with complaints of auditory hallucincations telling him to kill self by cutting. Utox was negative. Pt is voluntary.   Participants CTC;psychiatrist;OT;nursing   Behavioral Team Discussion   Participants Luan Farrell MD, Shell AKHTAR, Crispin Nguyen RN, Gene Nielsen LMFT   Progress Pt  has bruno uncooperative, demanding to see internal medicine   Anticipated length of stay 1 week   Continued Stay Criteria/Rationale the pt needs a through evalution of risk to self   Medical/Physical none noted in team meeting   Precautions suicide   Plan Evaluate pt's actual needs due to a history of malingering   Rationale for change in precautions or plan na   Safety Plan will be completed by unit therapist.   Anticipated Discharge Disposition home or self-care     Goal Outcome Evaluation:

## 2024-10-24 NOTE — PLAN OF CARE
BEH IP Unit Acuity Rating Score (UARS)  Patient is given one point for every criteria they meet.    CRITERIA SCORING   On a 72 hour hold, court hold, committed, stay of commitment, or revocation. 0    Patient LOS on BEH unit exceeds 20 days. 0  LOS: 0   Patient under guardianship, 55+, otherwise medically complex, or under age 11. 0   Suicide ideation without relief of precipitating factors. 1   Current plan for suicide. 1   Current plan for homicide. 0   Imminent risk or actual attempt to seriously harm another without relief of factors precipitating the attempt. 0   Severe dysfunction in daily living (ex: complete neglect for self care, extreme disruption in vegetative function, extreme deterioration in social interactions). 0   Recent (last 7 days) or current physical aggression in the ED or on unit. 0   Restraints or seclusion episode in past 72 hours. 0   Recent (last 7 days) or current verbal aggression, agitation, yelling, etc., while in the ED or unit. 0   Active psychosis. 0   Need for constant or near constant redirection (from leaving, from others, etc).  0   Intrusive or disruptive behaviors. 0   Patient requires 3 or more hours of individualized nursing care per 8-hour shift (i.e. for ADLs, meds, therapeutic interventions). 0   TOTAL 2
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
## 2024-10-24 NOTE — PLAN OF CARE
"  Problem: Adult Behavioral Health Plan of Care  Goal: Adheres to Safety Considerations for Self and Others  Outcome: Not Progressing  Note: S: Pt is a 59 year old homeless male.  He was BIB police with command hallucinations  with a plan to commit suicide by cutting his wrists.  He is voluntary.    B: [place] [facility] [place] 08/25/2024 in behavioral health.  He was in the ED and discharged 10/12/2024 no details.  Pt states he has been sober since 2000.  Per ED notes Pt has had increased depression with auditory hallucinations to kill himself.  Pt dx with Major depressive disorder,Generalized anxiety disorder, and PTSD.  HX of diabetes type 2.    A:  When pt arrived to the unit he refused a search , but with a lot of redirection and reassurance he agreed to one.  He refused to wear hospital scrubs and put his clothes back on.  I got an order for him to wear his clothes.  He was so upset he was awaken to come to the unit.  Pt stated he was hungry he was given a sandwich and some juice.  He made it perfectly clear he was not going to participate in anyway with the admission.  He stated \"Leave me the fuck a lone\".  Although when asked if he would harm himself he said was here to get help and would not harm himself. Pt has an envelope next to his bed with his drivers license, and credit cards.  He was very tense and agitated he was not going to give them up.  Even though it was explained to him they would be locked up in a safe.  He asked \"what kind of a place wakes up a man in the middle of the night who has not been able to sleep for weeks?\"  \"You are crazy I am going to sleep and don't bother me!!!\"  This writer was unable to get information from the patient.  He was shown to his room and informed of some of the unit policies and refused to listen.  He was given the admission packet.  He was uncooperative.    Blood pressure 135/87, pulse 110, temperature 97.8  F (36.6  C), temperature source Oral, resp. rate 18, " "height 1.753 m (5' 9\"), weight 106.6 kg (235 lb), SpO2 98%.        R:  Complete the admission  and get his Drivers license and cards to the safe.  Intervention: Develop and Maintain Individualized Safety Plan  Recent Flowsheet Documentation  Taken 10/24/2024 0400 by Lary Daniels RN  Safety Measures: safety rounds completed   Goal Outcome Evaluation:                        "
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
## 2024-10-25 PROCEDURE — 124N000002 HC R&B MH UMMC

## 2024-10-25 PROCEDURE — 99232 SBSQ HOSP IP/OBS MODERATE 35: CPT | Performed by: PSYCHIATRY & NEUROLOGY

## 2024-10-25 PROCEDURE — 250N000013 HC RX MED GY IP 250 OP 250 PS 637: Performed by: PSYCHIATRY & NEUROLOGY

## 2024-10-25 RX ORDER — IBUPROFEN 800 MG/1
800 TABLET, FILM COATED ORAL EVERY 8 HOURS PRN
COMMUNITY
End: 2024-11-15

## 2024-10-25 RX ORDER — ACETAMINOPHEN 325 MG/1
325-650 TABLET ORAL EVERY 6 HOURS PRN
Qty: 60 TABLET | Refills: 0 | Status: SHIPPED | OUTPATIENT
Start: 2024-10-25

## 2024-10-25 RX ORDER — METFORMIN HYDROCHLORIDE 500 MG/1
500 TABLET, EXTENDED RELEASE ORAL 2 TIMES DAILY WITH MEALS
Status: DISCONTINUED | OUTPATIENT
Start: 2024-10-25 | End: 2024-10-30 | Stop reason: HOSPADM

## 2024-10-25 RX ORDER — QUETIAPINE FUMARATE 25 MG/1
25 TABLET, FILM COATED ORAL 2 TIMES DAILY
Qty: 60 TABLET | Refills: 1 | Status: SHIPPED | OUTPATIENT
Start: 2024-10-25

## 2024-10-25 RX ORDER — QUETIAPINE FUMARATE 25 MG/1
25 TABLET, FILM COATED ORAL 2 TIMES DAILY
Status: ON HOLD | COMMUNITY
End: 2024-10-25

## 2024-10-25 RX ORDER — TRAZODONE HYDROCHLORIDE 50 MG/1
50 TABLET, FILM COATED ORAL
Qty: 30 TABLET | Refills: 0 | Status: SHIPPED | OUTPATIENT
Start: 2024-10-25

## 2024-10-25 RX ORDER — HYDROXYZINE HYDROCHLORIDE 25 MG/1
25 TABLET, FILM COATED ORAL EVERY 4 HOURS PRN
Qty: 60 TABLET | Refills: 0 | Status: SHIPPED | OUTPATIENT
Start: 2024-10-25

## 2024-10-25 RX ADMIN — ACETAMINOPHEN 650 MG: 325 TABLET ORAL at 08:49

## 2024-10-25 RX ADMIN — METFORMIN ER 500 MG 500 MG: 500 TABLET ORAL at 17:38

## 2024-10-25 RX ADMIN — ATORVASTATIN CALCIUM 40 MG: 40 TABLET, FILM COATED ORAL at 08:49

## 2024-10-25 RX ADMIN — DICLOFENAC SODIUM TOPICAL GEL, 1% 2 G: 10 GEL TOPICAL at 12:37

## 2024-10-25 RX ADMIN — IBUPROFEN 800 MG: 200 TABLET, FILM COATED ORAL at 22:20

## 2024-10-25 RX ADMIN — DICLOFENAC SODIUM TOPICAL GEL, 1% 2 G: 10 GEL TOPICAL at 08:52

## 2024-10-25 RX ADMIN — QUETIAPINE FUMARATE 25 MG: 25 TABLET ORAL at 08:49

## 2024-10-25 RX ADMIN — IBUPROFEN 800 MG: 200 TABLET, FILM COATED ORAL at 10:39

## 2024-10-25 RX ADMIN — ACETAMINOPHEN 650 MG: 325 TABLET ORAL at 19:00

## 2024-10-25 RX ADMIN — GLIPIZIDE 5 MG: 5 TABLET ORAL at 08:49

## 2024-10-25 RX ADMIN — DICLOFENAC SODIUM TOPICAL GEL, 1% 2 G: 10 GEL TOPICAL at 20:54

## 2024-10-25 RX ADMIN — QUETIAPINE FUMARATE 25 MG: 25 TABLET ORAL at 19:00

## 2024-10-25 ASSESSMENT — ACTIVITIES OF DAILY LIVING (ADL)
ADLS_ACUITY_SCORE: 0
LAUNDRY: WITH SUPERVISION
ADLS_ACUITY_SCORE: 0
DRESS: INDEPENDENT
ADLS_ACUITY_SCORE: 0
HYGIENE/GROOMING: INDEPENDENT
ADLS_ACUITY_SCORE: 0
ORAL_HYGIENE: INDEPENDENT
ADLS_ACUITY_SCORE: 0
ADLS_ACUITY_SCORE: 0

## 2024-10-25 NOTE — DISCHARGE INSTRUCTIONS
Behavioral Discharge Planning and Instructions    Summary: You were admitted on 10/23/2024  due to  suicidal ideation with auditory hallucinations telling you to harm self .  You were treated by Luan Farrell MD. You were evaluated as not having psychotic symptoms. You reported you were displeased with your housing situation and your housing service providers have not found a satisfactory solution for you. . You were discharged on 10/30/24  from [facility]  A crisis residence was extensively searched  for you. You insisted on a single room. . You do have an apartment to return to even though you are displeased with this.  Please continue to work with your housing providers to find a satisfactory solution to your housing situation..  [address]  [place] [address]    We located a crisis bed for you through [facility] but you declined their offer of admission for today. You had a second psychiatry opinion and you were assessed as safe to discharge.  The [facility] network crisis homes are:  [employer] (Mili Stiles, Kanika Martínez, Shaquille Singh) at [phone]  [facility] at [phone]  [facility] [place] at [phone] xt [phone]    If they do not have bends then [facility] might authorize [facility] at [phone]    Call or Text 988 or [facility] [phone] [facility] Mental Crisis Program if you do need help,    A discharge order was placed. You asked for a plan. This is what we can do for you under this short timeline.  Your housing worker also called you as you were discharging.    [facility] - Adults -@[phone]  Currently full @ 1:30PM  [facility] after 7;30 [facility] [facility]  [address] [place] [address]   2.3 mi  [phone]    Call back after 9PM for [facility]  AND ALSO  [facility]  [email or web address]  [facility]  [address], [place] [address]   15 mi  [phone]    TRANSPORTATION  [employer] can provide a cab to a discharge destination.  Use [employer] - [phone] - for transportation to your health care appointments.  Your [facility] ID is [id number]  They can pick you up in the [place] Lobby.  [facility]/ER at [address] [place] [address], [phone].              Main Diagnosis:   Major depressive disorder, recurrent severe with psychotic features;   Unspecified anxiety disorder;   polysubstance abuse in full remission per patient;   malingering should be ruled out.         Health Care Follow-up:   The [facility] Member Services number is [phone], Behavioral Health is option #4.   Use [employer] - [phone] - for transportation to your health care appointments.      Continue to work with your Housing CM with [facility]  [phone] to find housing that you are more comfortable with.       Attend your primary care hospital follow up appointment to establish care and address the physical health problems that were addressed while you where.  Friday, November 15, 2024 @ 1:40PM  Please talk to this doctor about a referral for Collaborative Care psychiatry services so a [facility] psychiatrist can see you.  Nov  15 New ED/Hospital Follow Up with Poppy Wilkinson  Friday Nov 15, 2024 1:40 PM  Please plan to arrive at the clinic at your \"Arrive By\" time for your appointment. Our late policy will be enforced based on this time. [facility]  [address] [place] [id number] [phone]          We have contacted the [facility] x2 for psychiatric medication management services. We have been unable to reach the scheduling staff.. Please do a walk in assessment to get mental health services. The address and hours are below.  [facility]  [address] [place] [address]  [facility] Phone: [phone]  Walk in Monday through Friday " between 8 and 3:00PM and meet with a  to do an assessment.  Clinic Scheduling: Phone: [phone] (Maryam @ [phone])  The Health Unit Coordinator has faxed these discharge instructions to fax:[phone]           Information will be faxed to your outpatient providers to ensure a healthy continuity of care for you.     Attend all scheduled appointments with your outpatient providers. Call at least 24 hours in advance if you need to reschedule an appointment to ensure continued access to your outpatient providers.     Major Treatments, Procedures and Findings:  You were provided with: a psychiatric assessment, assessed for medical stability, medication evaluation and/or management, group therapy, milieu management, and medical interventions    You were evaluated by internal medicine.    You had lab work done.    Your Tox screen was negative.    Your glucose was monitored.    Ref Range & Units          GLUCOSE BY METER POCT  70 - 99 mg/dL 245 High  298 High  356 High  242 High  182 High      .      Symptoms to Report: mood getting worse or thoughts of suicide    Early warning signs can include: increased thoughts or behaviors of suicide or self-harm  increased unusual thinking, such as paranoia or hearing voices    Safety and Wellness:  Take all medicines as directed.  Make no changes unless your doctor suggests them.      Follow treatment recommendations.  Refrain from alcohol and non-prescribed drugs.  If there is a concern for safety, call 911.    Resources:   Call or Text [facility] or [facility] [phone] [facility] Mental Crisis Program      Mental Health Crisis Resources  Throughout [place]: call **CRISIS (**437779)  Crisis Text Line: is available for free, 24/7 by texting [place] to 569906  Suicide Awareness Voices of Education (SAVE) (www.save.org): 152-996-CUZX (7245)  The National Suicide Prevention Lifeline is now: 988 Suicide and Crisis Lifeline. Call 988 anytime.  National  [place] on Mental Illness (www.[place].yee.org): 315.863.5711 or 031-339-8260.  Afrs2lehi: text the word LIFE to 10012 for immediate support and crisis intervention  Mental Health Consumer/Survivor Network of [place] (www.mhcsn.net): 684.123.3672 or 432-022-8153  Mental Health Association of [place] (www.mentalhealth.org): 548.709.4455 or 199-665-1993  Peer Support Connection [place] Warmline (PSC) 1-654.682.7551 Available from 5pm - 9am (7 days a week/365 days a year)  Call or Text 988 or [facility] 1-436.256.7313 [facility] Mental Crisis Program      General Medication Instructions:   See your medication sheet(s) for instructions.   Take all medicines as directed.  Make no changes unless your doctor suggests them.   Go to all your doctor visits.  Be sure to have all your required lab tests. This way, your medicines can be refilled on time.  Do not use any drugs not prescribed by your doctor.  Avoid alcohol.    Advance Directives:   Scanned document on file with [facility] [employer]? No scanned doc  Is document scanned?    Honoring Choices Your Rights Handout:    Was more information offered?      The Treatment team has appreciated the opportunity to work with you. If you have any questions or concerns about your recent admission, you can contact the unit which can receive your call 24 hours a day, 7 days a week. They will be able to get in touch with a Provider if needed. The unit number is [phone] .

## 2024-10-25 NOTE — PROGRESS NOTES
"[facility], [place]   Psychiatric Progress Note        Interim History:     The patient's care was discussed with the treatment team during the daily team meeting and/or staff's chart notes were reviewed.  Staff report patient slept for 6.5 hours last night and spent most of his day in his room/bed. Was cooperative with meds, though, questioned why he was prescribed Seroquel despite yesterday agreeing to take it. While meeting with CTC indicated that he was interested in getting a new housing. Didn't go to groups and got out of room only to eat or get meds, see RN's note below:    \"Pt was isolative and withdrawn his room resting in bed at the beginning of the shift. Pt came out and grabbed his meal tray and at in his room. Pt was observed to be not social with staff and peers. Pt presented as tense, irritable, guarded. Pt did not attend any group this shift said he might try tomorrow. Pt continues to endorse high anxiety and depression rated 8/10 as well as suicidal thoughts. Pt contracted for safety while in the unit. Pt showered this shift. Pt continues to c/o bilateral knee pain rated 8/10. Pt was medication compliant this shift and denied any side effects. Pt showered this shift. Staff will continue to monitor and support with current POC.\"    Met with patient: he was seen in his room/bed. Hi was resting, not asleep and talked to me. Reported feeling better today than yesterday, though, still complained of depression. Denied active Suicidal ideation and didn't appear to be responding to internal stimuli. I informed Hi that per my discussion with IM IM didn't feel that after recent evaluation of his left knee in ED and dx of osteoarthritis he needed an additional evaluation. Informed him that we discussed his meds with PharmD and increased per PTA meds his Metformin to bid and started him on Ibuprofen. Hi said that he was happy with his meds. He indicated that he would " "like to stay at this hospital until 10/31. I told him that we could not justify such a long hospitalization and would discharge him to a crisis bed on Monday. He agreed with that and had no more questions or concerns.         Medications:     Current Facility-Administered Medications   Medication Dose Route Frequency Provider Last Rate Last Admin    atorvastatin (LIPITOR) tablet 40 mg  40 mg Oral Daily Luan Farrell MD   40 mg at 10/25/24 0849    diclofenac (VOLTAREN) 1 % topical gel 2 g  2 g Topical 4x Daily Lidia Alcocer PA-C   2 g at 10/25/24 1237    glipiZIDE (GLUCOTROL) tablet 5 mg  5 mg Oral Daily Luan Farrell MD   5 mg at 10/25/24 0849    metFORMIN (GLUCOPHAGE XR) 24 hr tablet 500 mg  500 mg Oral BID w/meals Luan Farrell MD        pantoprazole (PROTONIX) EC tablet 40 mg  40 mg Oral Daily Luan Farrell MD   40 mg at 10/24/24 1333    QUEtiapine (SEROquel) tablet 25 mg  25 mg Oral BID Luan Farrell MD   25 mg at 10/25/24 0849          Allergies:   No Known Allergies       Labs:   No results found for this or any previous visit (from the past 24 hours).       Psychiatric Examination:     BP (!) 124/91 (BP Location: Left arm)   Pulse 98   Temp 98.6  F (37  C) (Oral)   Resp 18   Ht 1.753 m (5' 9\")   Wt 106.6 kg (235 lb)   SpO2 99%   BMI 34.70 kg/m    Weight is 235 lbs 0 oz  Body mass index is 34.7 kg/m .    Orthostatic Vitals         Most Recent      Sitting Orthostatic /91 10/25 0839    Sitting Orthostatic Pulse (bpm) 98 10/25 0839    Standing Orthostatic /87 10/25 0839    Standing Orthostatic Pulse (bpm) 96 10/25 0839            Appearance: awake, alert, dressed in hospital scrubs, and appeared as age stated  Attitude:  somewhat cooperative  Eye Contact:  fair  Mood:  anxious and sad   Affect:  constricted mobility  Speech:  clear, coherent  Psychomotor Behavior:  no evidence of tardive dyskinesia, dystonia, or tics and intact station, gait and " muscle tone  Throught Process:  linear and goal oriented  Associations:  no loose associations  Thought Content:  no evidence of suicidal ideation or homicidal ideation and no evidence of psychotic thought  Insight:  partial  Judgement:  fair  Oriented to:  time, person, and place  Attention Span and Concentration:  fair  Recent and Remote Memory:  fair    Clinical Global Impressions  First: 5/3 10/25/2024      Most recent:            Precautions:     Behavioral Orders   Procedures    Code 1 - Restrict to Unit    Routine Programming     As clinically indicated    Status 15     Every 15 minutes.    Suicide precautions: Suicide Risk: HIGH     Patients on Suicide Precautions should have a Combination Diet ordered that includes a Diet selection(s) AND a Behavioral Tray selection for Safe Tray - with utensils, or Safe Tray - NO utensils       Order Specific Question:   Suicide Risk     Answer:   HIGH          DIagnoses:     Major depressive disorder, recurrent severe with psychotic features; Unspecified anxiety disorder; polysubstance abuse in full remission per patient; malingering should be ruled out.          Plan:     Will as per discussion above increase Metformin dose, start on Ibuprofen as of 10/25/2024. Will continue to provide support and structure.  Will plan to discharge on Monday.    Total time spent was 37 minutes. Over 50% of times was spent counseling and coordination of care regarding coping skills, medication and discharge planning.

## 2024-10-25 NOTE — PLAN OF CARE
BEH IP Unit Acuity Rating Score (UARS)  Patient is given one point for every criteria they meet.    CRITERIA SCORING   On a 72 hour hold, court hold, committed, stay of commitment, or revocation. 0    Patient LOS on BEH unit exceeds 20 days. 0  LOS: 1   Patient under guardianship, 55+, otherwise medically complex, or under age 11. 0   Suicide ideation without relief of precipitating factors. 1   Current plan for suicide. 1   Current plan for homicide. 0   Imminent risk or actual attempt to seriously harm another without relief of factors precipitating the attempt. 0   Severe dysfunction in daily living (ex: complete neglect for self care, extreme disruption in vegetative function, extreme deterioration in social interactions). 0   Recent (last 7 days) or current physical aggression in the ED or on unit. 0   Restraints or seclusion episode in past 72 hours. 0   Recent (last 7 days) or current verbal aggression, agitation, yelling, etc., while in the ED or unit. 0   Active psychosis. 0   Need for constant or near constant redirection (from leaving, from others, etc).  0   Intrusive or disruptive behaviors. 0   Patient requires 3 or more hours of individualized nursing care per 8-hour shift (i.e. for ADLs, meds, therapeutic interventions). 0   TOTAL 2

## 2024-10-25 NOTE — PLAN OF CARE
"Goal Outcome Evaluation:    Patient presents with a blunted affect with some smiling noted. Reports mood as \"Good.\" Is pleasant, polite, calm, and cooperative. Thought content presents as relevant. Thought process presents as relevant. Speech is clear and coherent. Eye contact is good. Patient rates depression at a #6/10 and rates anxiety at a #7/10. Refused offered PRN medication for anxiety. Patient denies suicidal ideation, SIB, homicidal ideation, and auditory/visual hallucinations. Did not appear to be responding to internal stimuli this shift. No medical issues noted. HR slightly elevated with all other vital signs stable (see Vitals Flowsheet for details). Patient was mostly medication compliant. Refused 1600 scheduled Voltaren gel. Patient requested and received PRN Tylenol x1 and PRN Ibuprofen x1 for c/o #7/10 right knee pain. Ate his evening meal. Patient was out in the milieu this shift watching movies. Social with select peer.    /87   Pulse 104   Temp 97.2  F (36.2  C) (Temporal)   Resp 18   Ht 1.753 m (5' 9\")   Wt 106.6 kg (235 lb)   SpO2 99%   BMI 34.70 kg/m      Early this shift patient c/o a dry nose. Scant blood noted on tissue. Order obtained for PRN [place] Nasal Spray. Patient refused offered PRN nasal spray. No further complaints noted. Will monitor.                    "

## 2024-10-25 NOTE — PHARMACY-ADMISSION MEDICATION HISTORY
Pharmacist Admission Medication History    Admission medication history is complete. The information provided in this note is only as accurate as the sources available at the time of the update.    Information Source(s): Patient via in-person    Pertinent Information: Pt stated that he takes metformin 500mg in the AM and PM with glipizide 5mg in the AM. He also takes atorvastatin 40mg in the PM. He takes Seroquel 25mg every AM and PM. He use to take duloxetine daily but he hasn't had that in a while along with pantoprazole. He would like to restart those. He also mentions taking 800mg of ibuprofen as needed for pain in his knee.     Changes made to PTA medication list:  Added: Albuterol, atorvastatin, glipizide, metformin, pantoprazole  Deleted: Olanzapine, acetaminophen  Changed: None    Allergies reviewed with patient and updates made in EHR: yes    Medication History Completed By: Jenifer Buck [facility] 10/25/2024 10:14 AM    PTA Med List   Medication Sig Last Dose/Taking    albuterol (PROAIR HFA/PROVENTIL HFA/VENTOLIN HFA) 108 (90 Base) MCG/ACT inhaler Inhale 2 puffs into the lungs every 6 hours as needed for shortness of breath, wheezing or cough. More than a month    atorvastatin (LIPITOR) 40 MG tablet Take 40 mg by mouth daily. 10/23/2024    DULoxetine (CYMBALTA) 60 MG capsule Take 60 mg by mouth daily. Past Month    glipiZIDE (GLUCOTROL) 5 MG tablet Take 5 mg by mouth daily. 10/23/2024    ibuprofen (ADVIL/MOTRIN) 800 MG tablet Take 800 mg by mouth every 8 hours as needed for moderate pain. 10/23/2024    metFORMIN (GLUCOPHAGE XR) 500 MG 24 hr tablet Take 500 mg by mouth 2 times daily (with meals). 10/23/2024    pantoprazole (PROTONIX) 40 MG EC tablet Take 40 mg by mouth daily. More than a month    QUEtiapine (SEROQUEL) 25 MG tablet Take 25 mg by mouth 2 times daily. 10/23/2024

## 2024-10-25 NOTE — PLAN OF CARE
Problem: Sleep Disturbance  Goal: Adequate Sleep/Rest  Intervention: Promote Sleep/Rest  Recent Flowsheet Documentation  Taken 10/24/2024 1800 by Jose Elena RN  Sleep/Rest Enhancement:   awakenings minimized   consistent schedule promoted   family presence promoted   medication   music provided   natural light exposure provided    NOC Shift Report    Pt in bed at beginning of shift, breathing quiet and unlabored. Pt slept through shift. Pt slept 6.5 hours.     No pt complaints or concerns at this time.     No PRNs given. Will continue to monitor.

## 2024-10-26 PROCEDURE — 250N000013 HC RX MED GY IP 250 OP 250 PS 637: Performed by: PSYCHIATRY & NEUROLOGY

## 2024-10-26 PROCEDURE — 124N000002 HC R&B MH UMMC

## 2024-10-26 RX ORDER — QUETIAPINE FUMARATE 50 MG/1
50 TABLET, FILM COATED ORAL AT BEDTIME
Status: DISCONTINUED | OUTPATIENT
Start: 2024-10-26 | End: 2024-10-30 | Stop reason: HOSPADM

## 2024-10-26 RX ADMIN — PANTOPRAZOLE SODIUM 40 MG: 40 TABLET, DELAYED RELEASE ORAL at 08:54

## 2024-10-26 RX ADMIN — DICLOFENAC SODIUM TOPICAL GEL, 1% 2 G: 10 GEL TOPICAL at 08:56

## 2024-10-26 RX ADMIN — GLIPIZIDE 5 MG: 5 TABLET ORAL at 08:54

## 2024-10-26 RX ADMIN — METFORMIN ER 500 MG 500 MG: 500 TABLET ORAL at 18:14

## 2024-10-26 RX ADMIN — DICLOFENAC SODIUM TOPICAL GEL, 1% 2 G: 10 GEL TOPICAL at 22:10

## 2024-10-26 RX ADMIN — QUETIAPINE FUMARATE 25 MG: 25 TABLET ORAL at 08:55

## 2024-10-26 RX ADMIN — HYDROXYZINE HYDROCHLORIDE 25 MG: 25 TABLET, FILM COATED ORAL at 03:22

## 2024-10-26 RX ADMIN — METFORMIN ER 500 MG 500 MG: 500 TABLET ORAL at 08:54

## 2024-10-26 RX ADMIN — QUETIAPINE FUMARATE 50 MG: 50 TABLET ORAL at 22:10

## 2024-10-26 RX ADMIN — QUETIAPINE FUMARATE 25 MG: 25 TABLET ORAL at 22:10

## 2024-10-26 RX ADMIN — ATORVASTATIN CALCIUM 40 MG: 40 TABLET, FILM COATED ORAL at 08:54

## 2024-10-26 RX ADMIN — IBUPROFEN 800 MG: 200 TABLET, FILM COATED ORAL at 22:10

## 2024-10-26 ASSESSMENT — ACTIVITIES OF DAILY LIVING (ADL)
ADLS_ACUITY_SCORE: 0
HYGIENE/GROOMING: INDEPENDENT
ADLS_ACUITY_SCORE: 0
ORAL_HYGIENE: INDEPENDENT
ADLS_ACUITY_SCORE: 0
ADLS_ACUITY_SCORE: 0
DRESS: INDEPENDENT
ADLS_ACUITY_SCORE: 0
LAUNDRY: WITH SUPERVISION

## 2024-10-26 NOTE — PLAN OF CARE
"  Problem: Depressive Signs/Symptoms  Goal: Optimized Energy Level (Depressive Signs/Symptoms)  Outcome: Progressing   Goal Outcome Evaluation:    Plan of Care Reviewed With: patient        Affect is flat.  Mood is irritable.  Pt has been argumentative and demanding.  Pt requested to look at his paper chart.  As writer was handing him his paper chart, pt was murmuring to himself \"I know my rights and I know HIPAA\".  Pt looked at the belongings sheet and finally gave back the chart to writer.  Staff helped pt to do his laundry.  Pt had a pair of shorts that had strings on.  Pt grabbed his clothes from the dryer and walked back to his room.  Pt then changed into the shorts.  Staff and writer approached pt in his room.  Pt had changed into the shorts.  Pt refused to give up the shorts at first stating \"I have had it since Thursday.  Why are you asking me now?\".  Writer explained it is against the policy and that he could give it up and we will lock it up in the locker.  Pt insisted he was not going to give up but finally agreed after a male PA talked with him.  Will continue to assess and support plan of care.            ..BP (!) 133/90 (BP Location: Right arm)   Pulse 100   Temp 97.6  F (36.4  C) (Temporal)   Resp 18   Ht 1.753 m (5' 9\")   Wt 106.6 kg (235 lb)   SpO2 98%   BMI 34.70 kg/m                     "

## 2024-10-26 NOTE — PLAN OF CARE
"Goal Outcome Evaluation:    Patient presents with a blunted affect with some smiling noted. Reports mood as \"Good.\" Is pleasant, polite, calm, and cooperative. Thought content presents as relevant. Thought process presents as relevant. Speech is clear and coherent. Eye contact is good. Patient denies depression, anxiety, suicidal ideation, SIB, homicidal ideation, and auditory/visual hallucinations. Did not appear to be responding to internal stimuli this shift. No medical issues noted. Vital signs stable. Patient was mostly medication compliant. Refused 1600 scheduled Voltaren gel. Patient requested and received PRN Ibuprofen x1 for c/o #7/10 right knee pain. Did not attend group this shift. Ate his evening meal. Patient was out in the milieu this shift watching movies and a football game. Social with select peer.    /81 (BP Location: Right arm, Patient Position: Sitting, Cuff Size: Adult Regular)   Pulse 90   Temp 97.1  F (36.2  C) (Temporal)   Resp 18   Ht 1.753 m (5' 9\")   Wt 106.6 kg (235 lb)   SpO2 97%   BMI 34.70 kg/m      [facility] 10/28.                      "

## 2024-10-26 NOTE — PLAN OF CARE
"                                          Night Nursing Note  10/25/24 - 10/26/25        Hi was in bed at beginning of shift and appeared asleep.  Monitored q15 mins for safety.  Awake at 0300. Came out to the nurses station and asked this writer  and male staff  \"did you take my license from my room?. When staff told him  no, Hi stated \"it had to be one of you because it was gone in the morning,  but I got it back.\" He walked to the lounge and came back to the desk   and requested headphones. This writer gave him head phones.    He then requested prn medication for anxiety rated 8/10.  and something for sleep.This writer went to the med room to obtained his prn medications.  On return to the , pt was standing at the desk and had picked up  A book that was on the desk and stated, \"oh this must be someones book.\"  He then put it down.      Offered and accepted hydroxyzine 25mg for anxiety. Also informed that the   Hydroxyzine might help him get back to sleep.     Returned back to his room and went to bed.    At approximately 0345 staff informed this writer that the mini flashlight   was missing from the desk.  It was noted that Hi was at the desk alone  when this writer went to obtain his medications. Other staff did not have  the flashlight. Two staff went to his room and asked if he had the flashlight  and he stated \"no.\"  This writer also asked him if he had the flashlight.  Hi became loud and defensive and stated \"no I don't have it, you can search  me and my room.\"    This writer called security and it was verified by security that Hi could be  seen removing the flashlight from the nursing station desk. The ANS was notified  and it was decided to have the security come up and ask Hi with staff  present if he had the flashlight.  While this writer was talking to security in  the nurses back room, staff reported that Hi came out of his room and went to  get water from " "the ice machine.    Security suggested that the MARGOT Team also come to the unit and talk to   Hi.  When the MARGOT Team arrived, Hi agreed to have his room searched.  MARGOT Team did not find anything in his room and nothing in his pockets.    While searching his room, staff searched the lounge area including the garbage   can. Staff found the mini flashlight on top of the ice machine.  Security  and the MARGOT Team were informed and left the unit without any further problem.    Shortly after Security and MARGOT TEAM left the unit, Hi came out to the nurses  Station and requested a pen.  He wrote a note and then asked writer \"did you find  the flashlight, I told you I did not have it.\"  He then returned back to his room and went  back to sleep.    Continue to monitor, offer support and reassurance per care plan.      Slept 5 hrs.                        "

## 2024-10-27 LAB
GLUCOSE BLDC GLUCOMTR-MCNC: 298 MG/DL (ref 70–99)
GLUCOSE BLDC GLUCOMTR-MCNC: 356 MG/DL (ref 70–99)

## 2024-10-27 PROCEDURE — 124N000002 HC R&B MH UMMC

## 2024-10-27 PROCEDURE — 250N000013 HC RX MED GY IP 250 OP 250 PS 637: Performed by: PSYCHIATRY & NEUROLOGY

## 2024-10-27 RX ADMIN — METFORMIN ER 500 MG 500 MG: 500 TABLET ORAL at 19:00

## 2024-10-27 RX ADMIN — DICLOFENAC SODIUM TOPICAL GEL, 1% 2 G: 10 GEL TOPICAL at 21:25

## 2024-10-27 RX ADMIN — QUETIAPINE FUMARATE 50 MG: 50 TABLET ORAL at 21:25

## 2024-10-27 RX ADMIN — ATORVASTATIN CALCIUM 40 MG: 40 TABLET, FILM COATED ORAL at 08:45

## 2024-10-27 RX ADMIN — METFORMIN ER 500 MG 500 MG: 500 TABLET ORAL at 08:44

## 2024-10-27 RX ADMIN — GLIPIZIDE 5 MG: 5 TABLET ORAL at 08:45

## 2024-10-27 RX ADMIN — QUETIAPINE FUMARATE 25 MG: 25 TABLET ORAL at 08:44

## 2024-10-27 RX ADMIN — PANTOPRAZOLE SODIUM 40 MG: 40 TABLET, DELAYED RELEASE ORAL at 08:45

## 2024-10-27 RX ADMIN — QUETIAPINE FUMARATE 25 MG: 25 TABLET ORAL at 21:25

## 2024-10-27 ASSESSMENT — ACTIVITIES OF DAILY LIVING (ADL)
ADLS_ACUITY_SCORE: 0
ADLS_ACUITY_SCORE: 0
HYGIENE/GROOMING: INDEPENDENT
ADLS_ACUITY_SCORE: 0
ORAL_HYGIENE: INDEPENDENT
ADLS_ACUITY_SCORE: 0
ORAL_HYGIENE: INDEPENDENT
ADLS_ACUITY_SCORE: 0
LAUNDRY: WITH SUPERVISION
HYGIENE/GROOMING: INDEPENDENT
ADLS_ACUITY_SCORE: 0
DRESS: INDEPENDENT
ADLS_ACUITY_SCORE: 0
DRESS: INDEPENDENT
LAUNDRY: WITH SUPERVISION
ADLS_ACUITY_SCORE: 0
ADLS_ACUITY_SCORE: 0

## 2024-10-27 NOTE — PLAN OF CARE
"  Problem: Depressive Signs/Symptoms  Goal: Optimized Energy Level (Depressive Signs/Symptoms)  Outcome: Progressing   Goal Outcome Evaluation:        Affect is flat blunted.  Mood is irritable.  Pt has been isolative to his room coming out for meals only.  Pt has been coming out for meals.  No interaction with others observed.  Pt denies auditory and visual hallucination.  Pt is demanding and argumentative.  Pt approached writer demanding a turkey sandwich for 4 pm.  Explained to pt that he did not have a provider's order.  Pt was angry and stated \"I do not need a doctors order for food\".  Walked away to his room.  Medication compliant, no medication side effects reported or noted.  Will continue to assess.          ../87   Pulse 99   Temp 97.3  F (36.3  C) (Temporal)   Resp 18   Ht 1.753 m (5' 9\")   Wt 107.5 kg (237 lb)   SpO2 99%   BMI 35.00 kg/m         "

## 2024-10-27 NOTE — PLAN OF CARE
"Goal Outcome Evaluation:    Patient presents with a blunted affect with some smiling noted. Reports mood as \"Good.\" Is pleasant, polite, calm, and cooperative. Thought content presents as relevant. Thought process presents as relevant. Speech is clear and coherent. Eye contact is good. Patient denies depression, anxiety, suicidal ideation, SIB, homicidal ideation, and auditory/visual hallucinations. Did not appear to be responding to internal stimuli this shift. No medical issues noted. Vital signs stable. Patient was somewhat medication compliant. Refused 1600 scheduled Voltaren gel and scheduled supper-time Metformin. No c/o noted this shift.Ate his evening meal. Patient was out in the milieu this shift watching movies and a football game. Social with select peer.     BP (!) 122/90   Pulse 85   Temp 97.9  F (36.6  C) (Temporal)   Resp 18   Ht 1.753 m (5' 9\")   Wt 107.5 kg (237 lb)   SpO2 99%   BMI 35.00 kg/m      Early this shift patient was involved in a heated verbal altercation with another patient over the T.V. x2. Patient was visibly angry and yelling. Staff intervened  both patients. Patient then left the lounge and went to his room. Was able to self calm. T.V. was shut off the rest of the shift, per the Charge Nurse.     Patient had initially refused his scheduled supper-time Metformin d/t being upset over the verbal altercation with the other patient. An hour later patient approached this writer and requested his metformin. Metformin was administered. Patient also requested a PRN BG check. BG was 356. Patient is asymptomatic. Dr. Farrell paged and updated. Per Dr. Farrell: Recheck patient's BG at bed-time and update with the results. Follow-up BG was 298. Patient asymptomatic and playing a board game with peers. No complaints noted. Dr. Farrell updated with final results. No new orders received.    Patient [facility] tomorrow Monday 10/28.                "

## 2024-10-27 NOTE — PLAN OF CARE
Night Nursing Note   10/26/24 - 10/27/24      Hi was in bed at beginning of shift and appeared asleep.  Monitored q15 mins for safety.  Observed listening to headphones throughout the night.  Appeared to sleep majority of night without difficulty.  Continue to monitor, offer support and reassurance per care plan.    Slept 6 hrs.

## 2024-10-28 LAB — GLUCOSE BLDC GLUCOMTR-MCNC: 245 MG/DL (ref 70–99)

## 2024-10-28 PROCEDURE — 90832 PSYTX W PT 30 MINUTES: CPT

## 2024-10-28 PROCEDURE — 124N000002 HC R&B MH UMMC

## 2024-10-28 PROCEDURE — 99232 SBSQ HOSP IP/OBS MODERATE 35: CPT | Performed by: PSYCHIATRY & NEUROLOGY

## 2024-10-28 PROCEDURE — 250N000013 HC RX MED GY IP 250 OP 250 PS 637: Performed by: PSYCHIATRY & NEUROLOGY

## 2024-10-28 RX ADMIN — METFORMIN ER 500 MG 500 MG: 500 TABLET ORAL at 08:37

## 2024-10-28 RX ADMIN — DICLOFENAC SODIUM TOPICAL GEL, 1% 2 G: 10 GEL TOPICAL at 12:30

## 2024-10-28 RX ADMIN — ATORVASTATIN CALCIUM 40 MG: 40 TABLET, FILM COATED ORAL at 08:37

## 2024-10-28 RX ADMIN — METFORMIN ER 500 MG 500 MG: 500 TABLET ORAL at 19:06

## 2024-10-28 RX ADMIN — GLIPIZIDE 5 MG: 5 TABLET ORAL at 08:37

## 2024-10-28 RX ADMIN — QUETIAPINE FUMARATE 25 MG: 25 TABLET ORAL at 22:20

## 2024-10-28 RX ADMIN — PANTOPRAZOLE SODIUM 40 MG: 40 TABLET, DELAYED RELEASE ORAL at 08:37

## 2024-10-28 RX ADMIN — DICLOFENAC SODIUM TOPICAL GEL, 1% 2 G: 10 GEL TOPICAL at 22:22

## 2024-10-28 RX ADMIN — IBUPROFEN 800 MG: 200 TABLET, FILM COATED ORAL at 22:20

## 2024-10-28 RX ADMIN — IBUPROFEN 800 MG: 200 TABLET, FILM COATED ORAL at 03:41

## 2024-10-28 RX ADMIN — DICLOFENAC SODIUM TOPICAL GEL, 1% 2 G: 10 GEL TOPICAL at 17:47

## 2024-10-28 RX ADMIN — QUETIAPINE FUMARATE 25 MG: 25 TABLET ORAL at 08:37

## 2024-10-28 RX ADMIN — QUETIAPINE FUMARATE 50 MG: 50 TABLET ORAL at 22:21

## 2024-10-28 ASSESSMENT — ACTIVITIES OF DAILY LIVING (ADL)
ADLS_ACUITY_SCORE: 0
HYGIENE/GROOMING: INDEPENDENT
ADLS_ACUITY_SCORE: 0
DRESS: INDEPENDENT
ADLS_ACUITY_SCORE: 0
LAUNDRY: WITH SUPERVISION
ADLS_ACUITY_SCORE: 0
ADLS_ACUITY_SCORE: 0
DRESS: INDEPENDENT;STREET CLOTHES
ADLS_ACUITY_SCORE: 0
LAUNDRY: WITH SUPERVISION
ADLS_ACUITY_SCORE: 0
ORAL_HYGIENE: INDEPENDENT
ADLS_ACUITY_SCORE: 0
HYGIENE/GROOMING: INDEPENDENT
ADLS_ACUITY_SCORE: 0
ORAL_HYGIENE: INDEPENDENT

## 2024-10-28 NOTE — PLAN OF CARE
BEH IP Unit Acuity Rating Score (UARS)  Patient is given one point for every criteria they meet.    CRITERIA SCORING   On a 72 hour hold, court hold, committed, stay of commitment, or revocation. 0    Patient LOS on BEH unit exceeds 20 days. 0  LOS: 4   Patient under guardianship, 55+, otherwise medically complex, or under age 11. 0   Suicide ideation without relief of precipitating factors. 0   Current plan for suicide. 0   Current plan for homicide. 0   Imminent risk or actual attempt to seriously harm another without relief of factors precipitating the attempt. 0   Severe dysfunction in daily living (ex: complete neglect for self care, extreme disruption in vegetative function, extreme deterioration in social interactions). 0   Recent (last 7 days) or current physical aggression in the ED or on unit. 0   Restraints or seclusion episode in past 72 hours. 0   Recent (last 7 days) or current verbal aggression, agitation, yelling, etc., while in the ED or unit. 0   Active psychosis. 0   Need for constant or near constant redirection (from leaving, from others, etc).  0   Intrusive or disruptive behaviors. 0   Patient requires 3 or more hours of individualized nursing care per 8-hour shift (i.e. for ADLs, meds, therapeutic interventions). 0   TOTAL 0

## 2024-10-28 NOTE — PLAN OF CARE
"  Problem: Suicide Risk  Goal: Absence of Self-Harm  Outcome: Progressing   Goal Outcome Evaluation:    Plan of Care Reviewed With: patient      \"I'm the same.\" Depression is 8/10 which is an improvement, anxiety is 10/10, \"the Seroquel doesn't help the anxiety.\" States always has racing thoughts. Denies hallucinations, SI and SIB. \"I would still be better off dead then here.\" Patient states will not commit suicide. Does not attend groups. States is social with peers. Has spent most the shift laying prone in bed listening to headphones. Sat in lounge for meals. BG was 245 before lunch. Dr. Farrell was notified and placed an order for IM to see. IM ordered Hgb A1c and BMP. Patient was informed about the IM consult and IM ordered blood work. Patient declined the blood work, \"I don't like to be poked,\" and said he wanted to go. \"I wasn't taking my meds before I came into the hospital, it will all straighten out.\" Patient was told recent blood glucoses were high. IM and Dr. Farrell were notified. Dr. Farrell saw patient with CM. Dr. Farrell said he will discontinue the IM consult and patient will stay another day.           "

## 2024-10-28 NOTE — PLAN OF CARE
Pt appeared asleep most of the night until about 0330 when he woke with c/o right knee pain. Pt observed to be limping slightly while grimacing. He requested and received PRN Ibuprofen (800mg) around 0340. Writer offered him ice and heat packs, which he declined but he accepted a snack and briefly discussed anticipating discharge tomorrow morning. Polite and cooperative during interactions. He slept a total of 5 hours tonight.     Problem: Sleep Disturbance  Goal: Adequate Sleep/Rest  Outcome: Progressing

## 2024-10-28 NOTE — PLAN OF CARE
Team Note Due:  Thursday    Assessment/Intervention/Current Symtoms and Care Coordination:  Chart review and met with team, discussed pt progress, symptomology, and response to treatment.  Discussed the discharge plan and any potential impediments to discharge.      Have been working with the patient on finding a crisis bed.  [facility] - no beds  [facility] - they talked to him last week and he did not agree to their protocols and wanted a single room.    Pt then started saying that he wanted to go to a 90 day IRTS.    Plan is still to look for a crisis bed and discharge him tomorrow.         Discharge Plan or Goal:      Shelter or return to his apartment     Barriers to Discharge:    Pt is refusing to go back to his apartment.     Referral Status:      Primary Care Provider:  Name/Clinic:    Number:            [facility] [phone]         [facility]  Staff could not gain entry to apartment and phone did not work        Medication Management/Psychiatry:  Name/Clinic: [facility]    Number:      Legal Status:  Voluntary         Contacts (include ELLEN status):      Pt refuses to sign ELLEN for housing provider.      Upcoming Meetings and Dates/Important Information and next steps:      Find psychiatry and therapy

## 2024-10-28 NOTE — PLAN OF CARE
"Individual Therapy Note      Date of Service: October 28, 2024    Patient: Hi goes by \"Hi,\" uses he/him pronouns    Individuals Present: Hi & [facility]    Session start: 1350  Session end: 1415  Session duration in minutes: 20      Modality Used: Person Centered, Rapport Building, and Brief Therapy    Goals: Verbal processing, emotional support, safety planning    Patient Description of current symptoms: anxious     Mental Status Exam:   Attitude: cooperative  Eye Contact: good  Mood: anxious and sad   Affect: mood congruent  Speech: clear, coherent  Psychomotor Behavior: no evidence of tardive dyskinesia, dystonia, or tics  Thought Process:  logical, linear, and goal oriented  Associations: no loose associations  Thought Content: no evidence of psychotic thought, passive suicidal ideation present, no auditory hallucinations present, and no visual hallucinations present  Insight: fair  Judgement: fair  Attention Span and Concentration: fair    Pt progress: Met pt in interview room. Pt share some of his anxiety around his housing and feeling of safety. Pt also shared that he feels like he doesn't have much of a reason to live at time, but toward the end of the session was able to find some goals to work toward with volunteering and potentially going back to school. Pt did have a question about housing and was referred to  to resolve his question.     Treatment Objective(s) Addressed:   The focus of this session was on orienting the patient to therapy, identifying and practicing coping strategies, processing feelings related to grief, safety planning, and identifying an appropriate aftercare plan     Progress Towards Goals and Assessment of Patient:   Patient is making progress towards treatment goals as evidenced by feeling better and ready to discharge.       Therapeutic Intervention(s):   Provided active listening, unconditional positive regard, and validation.   Engaged in " safety planning identifying coping skills, warning signs, health support and resources, Understand and identify reasons for hospitalization, how to use the hospital to create change and prevent future hospitalizations , Coached on coping techniques/relaxation skills to help improve distress tolerance and managing intense emotions. , Identified and practiced coping skills, and Engaged in guided discovery, explored patient's perspectives and helped expand them through socratic dialogue      Plan/next step: Engaged in unit programming  Check in with therapist as needed      00042 - Psychotherapy (with patient) - 30 (16-37*) min    Patient Active Problem List   Diagnosis    Schizoaffective disorder, chronic condition with acute exacerbation (H)    Cocaine abuse (H)    Psychosis, unspecified psychosis type (H)    Depression, unspecified depression type    PTSD (post-traumatic stress disorder)    Major depressive disorder, recurrent severe without psychotic features (H)

## 2024-10-28 NOTE — PROGRESS NOTES
"[facility], [place]   Psychiatric Progress Note        Interim History:     The patient's care was discussed with the treatment team during the daily team meeting and/or staff's chart notes were reviewed.  Staff report patient slept for 5.5 hours last night and spent most of his day in his room/bed. Was cooperative with meds, mostly cooperative with cares, though, was also reported to be involved over weekend with verbal altercation with another patient about what to watch on TV. Denied presence of Suicidal ideation, Homicidal thoughts, psychotic symptoms and appeared to be in a fairly good mood. Blood sugars were running high: mid 200s- 300s, see RN's note below:    \"Patient presents with a blunted affect with some smiling noted. Reports mood as \"Good.\" Is pleasant, polite, calm, and cooperative. Thought content presents as relevant. Thought process presents as relevant. Speech is clear and coherent. Eye contact is good. Patient denies depression, anxiety, suicidal ideation, SIB, homicidal ideation, and auditory/visual hallucinations. Did not appear to be responding to internal stimuli this shift. No medical issues noted. Vital signs stable. Patient was somewhat medication compliant. Refused 1600 scheduled Voltaren gel and scheduled supper-time Metformin. No c/o noted this shift.Ate his evening meal. Patient was out in the milieu this shift watching movies and a football game. Social with select peer.      BP (!) 122/90   Pulse 85   Temp 97.9  F (36.6  C) (Temporal)   Resp 18   Ht 1.753 m (5' 9\")   Wt 107.5 kg (237 lb)   SpO2 99%   BMI 35.00 kg/m       Early this shift patient was involved in a heated verbal altercation with another patient over the T.V. x2. Patient was visibly angry and yelling. Staff intervened  both patients. Patient then left the lounge and went to his room. Was able to self calm. T.V. was shut off the rest of the shift, per the Charge Nurse.    " "  Patient had initially refused his scheduled supper-time Metformin d/t being upset over the verbal altercation with the other patient. An hour later patient approached this writer and requested his metformin. Metformin was administered. Patient also requested a PRN BG check. BG was 356. Patient is asymptomatic. Dr. Farrell paged and updated. Per Dr. Farrell: Recheck patient's BG at bed-time and update with the results. Follow-up BG was 298. Patient asymptomatic and playing a board game with peers. No complaints noted. Dr. Farrell updated with final results. No new orders received.\"    Met with patient: he was seen in his room/bed in presence of CTC. We discussed plans to discharge Hi to crisis bed and that by now we didn't have crisis bed available. Patient firmly stated that he would not get discharged to his apartment because he would not feel safe there. We agreed to wait until tomorrow and continue to look for a crisis bed for him. After CTC left we had long conversation about diabetes management. I told Hi that I was concerned about his elevated BS and asked for IM consult. Hi said that he would not allow any additional blood work and would not need IM consult: \"my blood sugars always fluctuate up and down, they will get better\". I told him that I would cancel IM consult. Hi had no questions or concerns.          Medications:     Current Facility-Administered Medications   Medication Dose Route Frequency Provider Last Rate Last Admin    atorvastatin (LIPITOR) tablet 40 mg  40 mg Oral Daily Luan Farrell MD   40 mg at 10/28/24 0837    diclofenac (VOLTAREN) 1 % topical gel 2 g  2 g Topical 4x Daily Lidia Alcocer PA-C   2 g at 10/28/24 1230    glipiZIDE (GLUCOTROL) tablet 5 mg  5 mg Oral Daily Luan Farrell MD   5 mg at 10/28/24 0837    metFORMIN (GLUCOPHAGE XR) 24 hr tablet 500 mg  500 mg Oral BID w/meals Luan Farrell MD   500 mg at 10/28/24 0837    " "pantoprazole (PROTONIX) EC tablet 40 mg  40 mg Oral Daily Luan Farrell MD   40 mg at 10/28/24 0837    QUEtiapine (SEROquel) tablet 25 mg  25 mg Oral BID Luan Farrell MD   25 mg at 10/28/24 0837    QUEtiapine (SEROquel) tablet 50 mg  50 mg Oral At Bedtime Luan Farrell MD   50 mg at 10/27/24 2125          Allergies:   No Known Allergies       Labs:     Recent Results (from the past 24 hours)   Glucose by meter    Collection Time: 10/27/24  6:58 PM   Result Value Ref Range    GLUCOSE BY METER POCT 356 (H) 70 - 99 mg/dL   Glucose by meter    Collection Time: 10/27/24  9:29 PM   Result Value Ref Range    GLUCOSE BY METER POCT 298 (H) 70 - 99 mg/dL   Glucose by meter    Collection Time: 10/28/24 12:15 PM   Result Value Ref Range    GLUCOSE BY METER POCT 245 (H) 70 - 99 mg/dL          Psychiatric Examination:     /87   Pulse 96   Temp 97.5  F (36.4  C) (Temporal)   Resp 18   Ht 1.753 m (5' 9\")   Wt 107.5 kg (237 lb)   SpO2 99%   BMI 35.00 kg/m    Weight is 237 lbs 0 oz  Body mass index is 35 kg/m .    Orthostatic Vitals         Most Recent      Standing Orthostatic /86 10/28 0750    Standing Orthostatic Pulse (bpm) 107 10/28 0750           Appearance: awake, alert, dressed in hospital scrubs, and appeared as age stated  Attitude:  somewhat cooperative  Eye Contact:  fair  Mood: less anxious and sad   Affect:  constricted mobility  Speech:  clear, coherent  Psychomotor Behavior:  no evidence of tardive dyskinesia, dystonia, or tics and intact station, gait and muscle tone  Throught Process:  linear and goal oriented  Associations:  no loose associations  Thought Content:  no evidence of suicidal ideation or homicidal ideation and no evidence of psychotic thought  Insight:  partial  Judgement:  fair  Oriented to:  time, person, and place  Attention Span and Concentration:  fair  Recent and Remote Memory:  fair    Clinical Global Impressions  First: 5/3 10/25/2024      Most " recent:            Precautions:     Behavioral Orders   Procedures    Code 1 - Restrict to Unit    Routine Programming     As clinically indicated    Status 15     Every 15 minutes.    Suicide precautions: Suicide Risk: HIGH     Patients on Suicide Precautions should have a Combination Diet ordered that includes a Diet selection(s) AND a Behavioral Tray selection for Safe Tray - with utensils, or Safe Tray - NO utensils       Order Specific Question:   Suicide Risk     Answer:   HIGH          DIagnoses:     Major depressive disorder, recurrent severe with psychotic features; Unspecified anxiety disorder; polysubstance abuse in full remission per patient; malingering should be ruled out.          Plan:     Had a fair weekend. There is no crisis bed available today. Will continue to look for it and plan on discharging patient tomorrow. Will discontinue IM consult. No medication changes today 10/28/2024.    Total time spent was 39 minutes. Over 50% of times was spent counseling and coordination of care regarding coping skills, medication and discharge planning.

## 2024-10-28 NOTE — PLAN OF CARE
"Goal Outcome Evaluation:      Problem: Depressive Signs/Symptoms  Goal: Increased Participation and Engagement (Depressive Signs/Symptoms)  Outcome: Not Progressing  Flowsheets (Taken 10/28/2024 6642)  Mutually Determined Action Steps (Increased Participation and Engagement): other (see comments)         The patient was found in his room with headphones on when approached by the writer. Upon introduction, the patient firmly declined to have his blood sugar checked, asking if dinner had arrived instead. After being informed that dinner was not yet available, he put his headphones back on. Eventually, the patient came to the [facility] to eat and, when offered his scheduled Metformin, he refused, stating, \"I don't want it right now, you see I'm eating. I'll come to you when I'm ready.\" He was left alone to finish his meal, during which he consumed 100% of his dinner. After eating, he engaged in card games with two female peers.    When the writer re-approached him for medication, the patient reiterated his stance, saying, \"I told you I will come find you when I'm ready to take it.\" His responses were short and he was not forthcoming with answers to assessment questions. While writer was on break, patient approached a different staff and asked to take his Metformin and he was given. Despite being on suicidal precautions, no concerning behaviors were observed during this time. The patient was seen watching movies and engaging selectively with peers, demonstrating the ability to communicate his needs to staff. However, he is noted to be non-compliant with his medication regimen. Staff will continue to monitor his behavior and encourage further engagement.  Vitals.BP (!) 141/92 (Patient Position: Sitting)   Pulse 109   Temp 97.6  F (36.4  C) (Temporal)   Resp 18   Ht 1.753 m (5' 9\")   Wt 107.5 kg (237 lb)   SpO2 99%   BMI 35.00 kg/m         "

## 2024-10-29 VITALS
DIASTOLIC BLOOD PRESSURE: 89 MMHG | BODY MASS INDEX: 35.1 KG/M2 | TEMPERATURE: 98.2 F | OXYGEN SATURATION: 100 % | HEIGHT: 69 IN | SYSTOLIC BLOOD PRESSURE: 126 MMHG | HEART RATE: 97 BPM | RESPIRATION RATE: 18 BRPM | WEIGHT: 237 LBS

## 2024-10-29 PROCEDURE — 250N000013 HC RX MED GY IP 250 OP 250 PS 637: Performed by: PSYCHIATRY & NEUROLOGY

## 2024-10-29 PROCEDURE — 124N000002 HC R&B MH UMMC

## 2024-10-29 PROCEDURE — 99233 SBSQ HOSP IP/OBS HIGH 50: CPT | Performed by: PSYCHIATRY & NEUROLOGY

## 2024-10-29 RX ORDER — QUETIAPINE FUMARATE 50 MG/1
50 TABLET, FILM COATED ORAL AT BEDTIME
Qty: 30 TABLET | Refills: 1 | Status: SHIPPED | OUTPATIENT
Start: 2024-10-29

## 2024-10-29 RX ADMIN — METFORMIN ER 500 MG 500 MG: 500 TABLET ORAL at 08:55

## 2024-10-29 RX ADMIN — METFORMIN ER 500 MG 500 MG: 500 TABLET ORAL at 18:33

## 2024-10-29 RX ADMIN — QUETIAPINE FUMARATE 25 MG: 25 TABLET ORAL at 08:56

## 2024-10-29 RX ADMIN — DICLOFENAC SODIUM TOPICAL GEL, 1% 2 G: 10 GEL TOPICAL at 12:12

## 2024-10-29 RX ADMIN — DICLOFENAC SODIUM TOPICAL GEL, 1% 2 G: 10 GEL TOPICAL at 16:39

## 2024-10-29 RX ADMIN — HYDROXYZINE HYDROCHLORIDE 25 MG: 25 TABLET, FILM COATED ORAL at 08:55

## 2024-10-29 RX ADMIN — QUETIAPINE FUMARATE 25 MG: 25 TABLET ORAL at 21:46

## 2024-10-29 RX ADMIN — QUETIAPINE FUMARATE 50 MG: 50 TABLET ORAL at 21:47

## 2024-10-29 RX ADMIN — PANTOPRAZOLE SODIUM 40 MG: 40 TABLET, DELAYED RELEASE ORAL at 08:56

## 2024-10-29 RX ADMIN — IBUPROFEN 800 MG: 200 TABLET, FILM COATED ORAL at 16:39

## 2024-10-29 RX ADMIN — DICLOFENAC SODIUM TOPICAL GEL, 1% 2 G: 10 GEL TOPICAL at 21:47

## 2024-10-29 RX ADMIN — HYDROXYZINE HYDROCHLORIDE 25 MG: 25 TABLET, FILM COATED ORAL at 13:27

## 2024-10-29 RX ADMIN — GLIPIZIDE 5 MG: 5 TABLET ORAL at 08:56

## 2024-10-29 RX ADMIN — ATORVASTATIN CALCIUM 40 MG: 40 TABLET, FILM COATED ORAL at 08:55

## 2024-10-29 ASSESSMENT — ACTIVITIES OF DAILY LIVING (ADL)
ADLS_ACUITY_SCORE: 0
ORAL_HYGIENE: INDEPENDENT
ADLS_ACUITY_SCORE: 0
DRESS: INDEPENDENT
ADLS_ACUITY_SCORE: 0
HYGIENE/GROOMING: INDEPENDENT
ADLS_ACUITY_SCORE: 0
ADLS_ACUITY_SCORE: 0
LAUNDRY: WITH SUPERVISION
ADLS_ACUITY_SCORE: 0

## 2024-10-29 NOTE — PLAN OF CARE
[facility] yesterday.  Pt would not agree to their rules and wanted a single room.  I called [facility] and they did not have an opneing.      IToday  called crisis homes again today.  [facility] - no openings  [facility] - opening on Thursday,  [facility] - left 2 messages  [facility] - called in the morning and afternoon      Made PCP appointment  Contacted [facility] for MH services.    Pt declined to be discharged, reporting he was suicidal.  MD getting second opinion.    Behavioral Discharge Planning and Instructions    Summary: You were admitted on 10/23/2024  due to suicidal ideation with auditory hallucinations telling you to harm self.  You were treated by Luan Farrell MD. You were evaluated as not having psychotic symptoms. You reported you were displeased with your housing sitiation and your housing service providers. . You were discharged on 10/29/24  [facility]  A crisis residence was sought for oy. You insisted on a single room. We could not find an available crisi placement for you. You do have an apartment to return to even though you are displeased with this.  Please continue to work with your housing providers.  [address]  [place] [address]      Main Diagnosis:   Major depressive disorder, recurrent severe with psychotic features;   Unspecified anxiety disorder;   polysubstance abuse in full remission per patient;   malingering should be ruled out.         Health Care Follow-up:   The [facility] Member Services number is [phone], Behavioral Health is option #4.   [facility] - [phone] - for transportation to your health care appointments.      [facility] to [phone]        Attend your primary care hospital follow up appointment to establish care and address the physical health problems that were addressed while you  "where.  Friday, November 15, 2024 @ 1:40PM  Nov  15 New ED/Hospital Follow Up with Poppy Wilkinson  Friday Nov 15, 2024 1:40 PM  Please plan to arrive at the clinic at your \"Arrive By\" time for your appointment. Our late policy will be enforced based on this time. [facility]  [address] [place] [address] [phone]          We have contacted the [facility] for medication management services. We have been unable to reach the scheduling stadf.. Please do a walk in to get mental health services.  [facility]  [address] [place] [address]  [facility] Phone: [phone]  Walk in Monday through Friday between 8 and 3:00PM and meet with a  to do an assessment.  Clinic Scheduling: Phone: [phone] (Maryam @ [phone])  The Health Unit Coordinator has faxed these discharge instructions to fax:[phone]           Information will be faxed to your outpatient providers to ensure a healthy continuity of care for you.     Attend all scheduled appointments with your outpatient providers. Call at least 24 hours in advance if you need to reschedule an appointment to ensure continued access to your outpatient providers.     Major Treatments, Procedures and Findings:  You were provided with: a psychiatric assessment, assessed for medical stability, medication evaluation and/or management, group therapy, milieu management, and medical interventions    You were evaluated by internal medicine.    You had lab work done.    Your Tox screen was negative.    Your glucose was monitored.    Ref Range & Units          GLUCOSE BY METER POCT  70 - 99 mg/dL 245 High  298 High  356 High  242 High  182 High      .      Symptoms to Report: mood getting worse or thoughts of suicide    Early warning signs can include: increased thoughts or behaviors of suicide or self-harm  increased unusual thinking, such as " paranoia or hearing voices    Safety and Wellness:  Take all medicines as directed.  Make no changes unless your doctor suggests them.      Follow treatment recommendations.  Refrain from alcohol and non-prescribed drugs.  If there is a concern for safety, call 911.    Resources:   Mental Health Crisis Resources  Throughout [place]: call **CRISIS (**715251)  Crisis Text Line: is available for free, 24/7 by texting [place] to 853072  Suicide Awareness Voices of Education (SAVE) (www.save.org): 344-450-DGCP (5382)  The [place] Suicide Prevention Lifeline is now: 988 Suicide and Crisis Lifeline. Call 988 anytime.  National [place] on Mental Illness (www.[place].yee.org): 741.494.8928 or 100-764-5895.  Zukw3wtqj: text the word LIFE to 97223 for immediate support and crisis intervention  Mental Health Consumer/Survivor Network of [place] (www.mhcsn.net): 224.179.1380 or 853-434-9221  Mental Health Association of [place] (www.mentalhealth.org): 907.687.4862 or 914-456-3422  Peer Support Connection [place] Warmline (PSC) 1-218.380.3276 Available from 5pm - 9am (7 days a week/365 days a year)  Call or Text 308 or [facility] 1-654.704.1147 [facility] Mental Crisis Program      General Medication Instructions:   See your medication sheet(s) for instructions.   Take all medicines as directed.  Make no changes unless your doctor suggests them.   Go to all your doctor visits.  Be sure to have all your required lab tests. This way, your medicines can be refilled on time.  Do not use any drugs not prescribed by your doctor.  Avoid alcohol.    Advance Directives:   Scanned document on file with [employer]? No scanned doc  Is document scanned?    Honoring Choices Your Rights Handout:    Was more information offered?      The Treatment team has appreciated the opportunity to work with you. If you have any questions or concerns about your recent admission, you can contact the unit which can receive your call 24 hours a day, 7 days a week. They will  be able to get in touch with a Provider if needed. The unit number is [phone] .

## 2024-10-29 NOTE — PLAN OF CARE
"  Problem: Adult Behavioral Health Plan of Care  Goal: Optimized Coping Skills in Response to Life Stressors  Outcome: Progressing   Goal Outcome Evaluation:    Pt is social with select peers at start of shift and noted to be somewhat withdrawn soon after. He watches t.v. and listens to headphones.He talks about keeping himself busy, playing monopoly and being around others and said it helps keep him cope and improve mental health. He states, \"I am doing what I can\". Pt reports feeling safe in the hospital and said he does not feel that he could be safe if discharged. Pt said he would like to discharge on Friday when his cousin is able to help him out. Pt said that being homeless is tied to his mental health deteriorating. He reports anxiety is a '10' and has using Hydroxyzine and coping skills. He reports he has racing thoughts and denies hallucinations. Pt has left knee pain rated at '7'/10 - prn Ibuprofen given. At pain reassess pt reports intervention as effective. Pt does not attend the evening Art Therapy group, he instead listens to headphones while sitting in [place] chair and dances to music. He requests his [facility] help him contact Delfino from [facility]. He has a good appetite, ate 100% of meal. Accepts scheduled medications.                          "

## 2024-10-29 NOTE — PLAN OF CARE
Occupational Therapy Non-Attendance Note:    Pt has not attended scheduled occupational therapy sessions as of 10/29/2024. Encourage attendance and participation as appropriate.

## 2024-10-29 NOTE — PROGRESS NOTES
"[facility], [place]   Psychiatric Progress Note        Interim History:     The patient's care was discussed with the treatment team during the daily team meeting and/or staff's chart notes were reviewed.  Staff report patient slept for 6.25 hours last night. He spent some time in common areas and was seen socializing with select peers. Was partially cooperative with meds and cares, refusing, then, agreeing to take Metformin, refusing vitals. Was reported to play card games with two female peers, no concerning behaviors were reported, see RN's note below:    \"The patient was found in his room with headphones on when approached by the writer. Upon introduction, the patient firmly declined to have his blood sugar checked, asking if dinner had arrived instead. After being informed that dinner was not yet available, he put his headphones back on. Eventually, the patient came to the lounge to eat and, when offered his scheduled Metformin, he refused, stating, \"I don't want it right now, you see I'm eating. I'll come to you when I'm ready.\" He was left alone to finish his meal, during which he consumed 100% of his dinner. After eating, he engaged in card games with two female peers.    When the writer re-approached him for medication, the patient reiterated his stance, saying, \"I told you I will come find you when I'm ready to take it.\" His responses were short and he was not forthcoming with answers to assessment questions. While writer was on break, patient approached a different staff and asked to take his Metformin and he was given. Despite being on suicidal precautions, no concerning behaviors were observed during this time. The patient was seen watching movies and engaging selectively with peers, demonstrating the ability to communicate his needs to staff. However, he is noted to be non-compliant with his medication regimen. Staff will continue to monitor his behavior and encourage " "further engagement.  Vitals.BP (!) 141/92 (Patient Position: Sitting)   Pulse 109   Temp 97.6  F (36.4  C) (Temporal)   Resp 18   Ht 1.753 m (5' 9\")   Wt 107.5 kg (237 lb)   SpO2 99%   BMI 35.00 kg/m  \"    Met with patient: he was seen in his room/bed first time and 2nd time in common area. I told Hi that despite our attempts to find for him crisis bed we could not find one, informed him that one of agencies owning crisis beds advised us that they knew Hi from the past and could not take him because of his demands to have a single room, his disagreement with their pass policies. I advised Hi to be discharged back to his apartment. After hearing this Hi immediately stated that he didn't feel safe to be discharged to his apartment, that he had Suicidal ideation and would go to ED after discharge and get himself readmitted. He made a statement that this provider will be responsible for him for 30 days after his discharge and demanded 2nd opinion. I promised to talk to one of colleagues and arrange for a 2nd opinion. I talked to Dr. Mccullough who said that she would not be able to see Hi today and, then, to Dr. Tavera who said that either he or Dr. Choe would see Hi tomorrow. I passed this information to the patient.  Hi said that he had no more questions or concerns.          Medications:     Current Facility-Administered Medications   Medication Dose Route Frequency Provider Last Rate Last Admin    atorvastatin (LIPITOR) tablet 40 mg  40 mg Oral Daily Luan Farrell MD   40 mg at 10/29/24 0855    diclofenac (VOLTAREN) 1 % topical gel 2 g  2 g Topical 4x Daily Lidia Alcocer PA-C   2 g at 10/29/24 1212    glipiZIDE (GLUCOTROL) tablet 5 mg  5 mg Oral Daily Luan Farrell MD   5 mg at 10/29/24 0856    metFORMIN (GLUCOPHAGE XR) 24 hr tablet 500 mg  500 mg Oral BID w/meals Luan Farrell MD   500 mg at 10/29/24 0855    pantoprazole (PROTONIX) EC tablet 40 mg " " 40 mg Oral Daily Luan Farrell MD   40 mg at 10/29/24 0856    QUEtiapine (SEROquel) tablet 25 mg  25 mg Oral BID Luan Farrell MD   25 mg at 10/29/24 0856    QUEtiapine (SEROquel) tablet 50 mg  50 mg Oral At Bedtime Luan Farrell MD   50 mg at 10/28/24 2221          Allergies:   No Known Allergies       Labs:     No results found for this or any previous visit (from the past 24 hours).         Psychiatric Examination:     /80 (Patient Position: Sitting, Cuff Size: Adult Large)   Pulse 88   Temp 97.4  F (36.3  C) (Temporal)   Resp 18   Ht 1.753 m (5' 9\")   Wt 107.5 kg (237 lb)   SpO2 99%   BMI 35.00 kg/m    Weight is 237 lbs 0 oz  Body mass index is 35 kg/m .    Orthostatic Vitals         Most Recent      Standing Orthostatic /86 10/28 0750    Standing Orthostatic Pulse (bpm) 107 10/28 0750           Appearance: awake, alert, dressed in hospital scrubs, and appeared as age stated  Attitude:  minimally cooperative  Eye Contact:  fair  Mood: \"I am very depressed and suicidal\".  Affect:  constricted mobility  Speech:  clear, coherent  Psychomotor Behavior:  no evidence of tardive dyskinesia, dystonia, or tics and intact station, gait and muscle tone  Throught Process:  linear and goal oriented  Associations:  no loose associations  Thought Content:  questionable suicidal ideation, denies homicidal ideation and no evidence of psychotic thought  Insight:  partial  Judgement:  impaired  Oriented to:  time, person, and place  Attention Span and Concentration:  fair  Recent and Remote Memory:  fair    Clinical Global Impressions  First: 5/3 10/25/2024      Most recent:            Precautions:     Behavioral Orders   Procedures    Code 1 - Restrict to Unit    Routine Programming     As clinically indicated    Status 15     Every 15 minutes.    Suicide precautions: Suicide Risk: HIGH     Patients on Suicide Precautions should have a Combination Diet ordered that includes a Diet " selection(s) AND a Behavioral Tray selection for Safe Tray - with utensils, or Safe Tray - NO utensils       Order Specific Question:   Suicide Risk     Answer:   HIGH          DIagnoses:     Major depressive disorder, recurrent severe with psychotic features; Unspecified anxiety disorder; polysubstance abuse in full remission per patient; malingering should be ruled out.          Plan:     Patient's behavior is clearly manipulative, not very cooperative with treatment team. There is no crisis bed available today and we could not arrange for a 2nd opinion to see him today.. Will continue to look for crisis bed, 2nd opinion will see patient tomorrow and plan on discharging patient after that. No medication changes today 10/29/2024.    Total time spent was 52 minutes. Over 50% of times was spent counseling and coordination of care regarding coping skills, medication and discharge planning, discussing patient's request for 2nd opinion with treatment team and med director.

## 2024-10-29 NOTE — PLAN OF CARE
BEH IP Unit Acuity Rating Score (UARS)  Patient is given one point for every criteria they meet.    CRITERIA SCORING   On a 72 hour hold, court hold, committed, stay of commitment, or revocation. 0    Patient LOS on BEH unit exceeds 20 days. 0  LOS: 5   Patient under guardianship, 55+, otherwise medically complex, or under age 11. 0   Suicide ideation without relief of precipitating factors. 0   Current plan for suicide. 0   Current plan for homicide. 0   Imminent risk or actual attempt to seriously harm another without relief of factors precipitating the attempt. 0   Severe dysfunction in daily living (ex: complete neglect for self care, extreme disruption in vegetative function, extreme deterioration in social interactions). 0   Recent (last 7 days) or current physical aggression in the ED or on unit. 0   Restraints or seclusion episode in past 72 hours. 0   Recent (last 7 days) or current verbal aggression, agitation, yelling, etc., while in the ED or unit. 0   Active psychosis. 0   Need for constant or near constant redirection (from leaving, from others, etc).  0   Intrusive or disruptive behaviors. 0   Patient requires 3 or more hours of individualized nursing care per 8-hour shift (i.e. for ADLs, meds, therapeutic interventions). 0   TOTAL 0

## 2024-10-29 NOTE — PLAN OF CARE
"Goal Outcome Evaluation:    Problem: Suicide Risk  Goal: Absence of Self-Harm  Outcome: Progressing     Pt presents calm, uncooperative and times, and intermittently irritable. Pt reports SI/HI/SIB, anxiety, depression, and auditory and visual hallucinations. Writer asked Pt to elaborate on these feelings and experiences, however Pt was guarded and became irritated explaining he does not want to discuss further. Pt came out for morning medications, and when writer tried to scan his wristband at the , Pt became irritable due to that not being where he wants to scan expressing he does not understand why writer isn't scanning or giving medications where Pt wants.   Pt split time between room and milieu but was withdrawn for much of shift and listening to headphones.     Around 1320, Pt expressed that he is upset about \"being pushed out\" of the hospital while he is not \"mentally stable\". Pt reports that he is still feeling suicidal and stated \"I don't feel safe discharging, if I discharge I'll hurt myself because I don't want to live anymore, I want to end it all\". Pt explained that while in the hospital he feels safe. Pt explains he is unable to go back to his apartment, and his cousin can't pick him up until Friday. Pt stated, \"I'm not stable, they can't make me leave\".   Dr. Farrell spoke with Pt and informed writer that Pt was told he will be able to get a second opinion tomorrow from another provider regarding his discharge plan.    Pt did not report concerns with bowel/bladder. Pt denies pain. Pt requests PRN Hydroxyzine for anxiety.  Vital signs refused initially, later Pt asked and was upset that \"no one asked\" him previously, medication compliant, behaviorally in control throughout shift.     "

## 2024-10-30 PROCEDURE — 250N000013 HC RX MED GY IP 250 OP 250 PS 637: Performed by: PSYCHIATRY & NEUROLOGY

## 2024-10-30 PROCEDURE — 99239 HOSP IP/OBS DSCHRG MGMT >30: CPT | Performed by: PSYCHIATRY & NEUROLOGY

## 2024-10-30 RX ADMIN — GLIPIZIDE 5 MG: 5 TABLET ORAL at 08:27

## 2024-10-30 RX ADMIN — METFORMIN ER 500 MG 500 MG: 500 TABLET ORAL at 08:27

## 2024-10-30 RX ADMIN — ATORVASTATIN CALCIUM 40 MG: 40 TABLET, FILM COATED ORAL at 08:27

## 2024-10-30 RX ADMIN — QUETIAPINE FUMARATE 25 MG: 25 TABLET ORAL at 08:27

## 2024-10-30 RX ADMIN — PANTOPRAZOLE SODIUM 40 MG: 40 TABLET, DELAYED RELEASE ORAL at 08:27

## 2024-10-30 ASSESSMENT — LIFESTYLE VARIABLES: SKIP TO QUESTIONS 9-10: 0

## 2024-10-30 ASSESSMENT — ACTIVITIES OF DAILY LIVING (ADL)
ADLS_ACUITY_SCORE: 0
LAUNDRY: UNABLE TO COMPLETE
ADLS_ACUITY_SCORE: 0
HYGIENE/GROOMING: INDEPENDENT
ADLS_ACUITY_SCORE: 0
ADLS_ACUITY_SCORE: 0
ORAL_HYGIENE: INDEPENDENT
ADLS_ACUITY_SCORE: 0
ADLS_ACUITY_SCORE: 0
DRESS: INDEPENDENT
ADLS_ACUITY_SCORE: 0
ADLS_ACUITY_SCORE: 0
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
## 2024-10-30 NOTE — PROVIDER NOTIFICATION
Patient refused to answer admission questions.  Admission assessment is BENITA, unable to assess.

## 2024-10-30 NOTE — PLAN OF CARE
Pt appeared to sleep for a total of 5.5 hours overnight. No PRN medications were administered and no concerns were noted.     Problem: Sleep Disturbance  Goal: Adequate Sleep/Rest  Outcome: Progressing

## 2024-10-30 NOTE — PLAN OF CARE
"  Problem: Adult Behavioral Health Plan of Care  Goal: Plan of Care Review  Recent Flowsheet Documentation  Taken 10/30/2024 1300 by Bianka Gaytan RN  Patient Agreement with Plan of Care: agrees   Goal Outcome Evaluation:    Plan of Care Reviewed With: patient             The patient has a discharge order.  The patient is approached and asked the time that he plans to discharge.  The patient stated, \"I don't know.\"  The Provider and CTC has worked to assist the patient with discharge options.  The patient is uncooperative.  The patient is refusing to gather his belongings and discharge.  The PA notified this writer, the patient stated he is not leaving.  The patient discharge order is modified with instructions to call Security if the patient refuses to discharge. This writer attempted to communicate with the patient a second time to discuss discharge.  The patient refused to speak with the writer. Security is called to assist with the discharge.        "
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
## 2024-10-30 NOTE — DISCHARGE SUMMARY
"Psychiatric Discharge Summary    Hi Do MRN# [id number]   Age: 59 year old YOB: 1965     Date of Admission:  10/23/2024  Date of Discharge:  10/30/2024  Admitting Physician:  Luan Farrell MD  Discharge Physician:  Luan Farrell MD (Contact: [phone])         Event Leading to Hospitalization:     Depression with Suicidal ideation and plan to cut his wrists.     History of present illness: per ED note: Hi Do presents to the ED via police. Patient is presenting to the ED for the following concerns: Anxiety, Depression, Paranoia, Worsening psychosocial stress, Suicidal ideation.   Factors that make the mental health crisis life threatening or complex are:  Patient has had  thoughts of suicide for most of his life. Pt had two attempts of suicide when he was 16 and 27 yo. Pt reports that the SI has gotten worse in the past 2 years, where he's felt he is just \"existing, not living.\" Pt reports that the past week has been a lot worse with thoughts of suicide with plan of cutting his wrist. Pt also reports hearing voices that in the past 6 months have been more intense, and the last week the worst. Pt reports the voices getting louder and telling him to \"end it.\" Pt reports that he has taken medication in the past (Seroquel) that was helpful, but he didn't re-fill and continue taking this med when insurance changed and he needed to find a new provider. Pt had a therapist and a med mgmt provider, but no longer does. Pt is also homeless at this time, and stressed about that. Patient is a 60 yo man who came to the ED due to SI and auditory hallucinations. Pt reports that he has struggled with MH since he can remember. Pt reports current SI with plan to cut his wrist. Pt was previously in the hospital on 10/12 for the same SI with plan. Pt reports past suicide attempts. Pt denies HI or SIB. Pt was taking medication for MH but did not ref-fill meds when insurance changed and he " "needed to find new providers to fill the meds. Pt reports the Seroquel being helpful at that time. Pt reports hearing voices that used to be a whisper but have gotten chau in the past 6 months, telling him to \"end it.\" Pt reports having nightmeres where he wakes up to the voices in a panic attack.      During visit with this provider: patient was found in his bed. He stood up and went with me to the conference room. Maintained fair eye contact. Reported feeling depressed and stressed out to the point of contemplating to cut his wrists with knife, stated that he had been hearing voices telling him to take his own life. Denied that voices told him to harm anyone else. Admitted that one of major stressors is being homeless for the last 3 weeks. Said that he left previous housing because it was \"drugs infested and unsafe\". Reported difficulties with sleep, fluctuating appetite, feeling hopeless and helpless. Said that he ran out of his Seroquel and had not been taking it lately. We discussed restarting him back on it. Hi agreed with that, said that he would not take Cymbalta or any other antidepressants because of sexual side effects with them. He agreed to stay at this facility voluntarily, asked to see IM to address chronic left knee pain. Declined my suggestion to prescribe Voltaren gel. I promised to ask for IM consult. Hi had no other questions or concerns. Shortly after visit with him I received a call from IM who informed me that patient was seen recently for left knee pain, diagnosed with osteoarthritis and suggested that patient didn't need to be seen for the same reason again. Voltaren Gel was prescribed by IM and referral to orthopedics made.       See Admission note by by admitting physician/nurse-practitioner Luan Farrell MD for additional details.          DIagnoses:     Major depressive disorder, recurrent severe with psychotic features; Unspecified anxiety disorder; polysubstance abuse " in full remission per patient; malingering.          Labs:      Latest Reference Range & Units Most Recent 10/23/24 23:28 10/27/24 18:58 10/27/24 21:29 10/28/24 12:15   GLUCOSE BY METER POCT 70 - 99 mg/dL 245 (H)  10/28/24 12:15  356 (H) 298 (H) 245 (H)   SARS CoV2 PCR Negative  Negative  10/23/24 23:28 Negative      Amphetamine Qual Urine Screen Negative  Screen Negative  10/23/24 21:10       Fentanyl Qual Urine Screen Negative  Screen Negative  10/23/24 21:10       Cocaine Urine Screen Negative  Screen Negative  10/23/24 21:10       Benzodiazepine Urine Screen Negative  Screen Negative  10/23/24 21:10       Opiates Qualitative Urine Screen Negative  Screen Negative  10/23/24 21:10       PCP Urine Screen Negative  Screen Negative  10/23/24 21:10       Cannabinoids Qual Urine Screen Negative  Screen Negative  10/23/24 21:10       Barbiturates Qual Urine Screen Negative  Screen Negative  10/23/24 21:10       XR PELVIS AND HIP RIGHT 2 VIEWS  Rpt (E)  10/12/24 08:13       US LOWER EXTREMITY VENOUS DUPLEX LEFT PORTABLE  Rpt (E)  10/12/24 10:37       US TESTICULAR AND SCROTUM WITH DOPPLER LIMITED  Rpt (E)  9/5/24 09:14       UNMAPPED IMAGING RESULT  Rpt (E)  10/12/24 08:31       (H): Data is abnormally high  (E): External lab result  Rpt: View report in Results Review for more information         Consults:   IM was consulted per patient request to address his chronic knee pain. IM didn't feel that patient needed to be see as his knee pain was evaluated at ED on 10/12/24, had lab work and imaging. Recommended to take Ibuprofen and Tylenol. For more details, please, see Internal Medicine note. Appreciate medical team's input.          Hospital Course:   Hi Do was admitted to [facility] with attending Luan Farrell MD as a voluntary patient. The patient was placed under status 15 (15 minute checks) to ensure patient safety. Hi presented as isolative and withdrawn, irritable, though, he could be pleasant  "and cooperative. He had been refusing to attend groups and often times refused vitals checks, refused to take medications when he was offered them stating that he would take them when he wanted to. I offered him to be seen by IM because of his consistently running high blood sugars. Hi declined to see IM and refused blood work that IM wanted to do. His main focus during visits with myself and CTC was on finding a new housing. Hi stated repeatedly that he didn't feel safe at his apartment because of people using drugs around him and that he needed to stay at this hospital until safe placement became available. He reported Auditory hallucinations and passive Suicidal ideation. Subjectively, patient didn't appear to be responding to internal stimuli and his presentation was reality based. Hi was not very cooperative with medications. He refused to take any antidepressants despite stating that he was depressed, only agreed to take Seroquel prn. Our initial intent was to discharge Hi on Monday to a crisis bed. Unfortunately no crisis bed was available on Monday and patient demanded to stay at this hospital until Friday and be discharged to his cousin. We told him that he didn't meet criteria for discharge and we would keep looking for a Crisis Bed. On Tuesday Hi again refused to be discharged and requested to see another provider for a 2nd opinion. I was unable to find for him another provider on Tuesday. We agreed that he would stay at this hospital until Wednesday. On Wednesday patient was seen for a 2nd opinion by Dr. Choe who also didn't feel that patient was meeting criteria for hospitalization and supported discharging him to a crisis bed. Patient was offered crisis bed at [place], [place]. He was screened for that bed and said that he would not go there today, because \"I have other things to do, I will call them on Monday\". Myself, [facility], RN and nursing student went to meet with the patient and " talked to him in his room. Hi was pretty irritated and stated that he would talk to only myself and CTC and nursing students and RN should leave. They did that. During our meeting with Hi patient stated again that he didn't want to leave for [facility] bed and would like to stay at this hospital until Monday. We again told him that he had no grounds to remain hospitalized and 2nd opinion felt the same and was in support of discharge. Patient appeared to be irritated, continue to make vague statements of not feeling safe, we didn't see any evidence of presence of immediate danger to self or others.   I instructed RN to call security if patient unwilling to leave hospital on his own.       Hi Do did not participate in groups and was not visible in the milieu.     The patient's symptoms of psychosis improved, unclear if depression improved, See discussion above.     Hi Do was released to home. At the time of discharge Hi Do was determined to not be a danger to himself or others.          Discharge Medications:     Current Discharge Medication List        START taking these medications    Details   diclofenac (VOLTAREN) 1 % topical gel Apply 2 g topically 4 times daily.  Qty: 100 g, Refills: 0    Associated Diagnoses: Chronic pain of left knee      hydrOXYzine HCl (ATARAX) 25 MG tablet Take 1 tablet (25 mg) by mouth every 4 hours as needed for anxiety.  Qty: 60 tablet, Refills: 0    Associated Diagnoses: PTSD (post-traumatic stress disorder)      traZODone (DESYREL) 50 MG tablet Take 1 tablet (50 mg) by mouth nightly as needed for sleep (may repeat after 60 minutes).  Qty: 30 tablet, Refills: 0    Associated Diagnoses: Anxiety           CONTINUE these medications which have CHANGED    Details   acetaminophen (TYLENOL) 325 MG tablet Take 1-2 tablets (325-650 mg) by mouth every 6 hours as needed for mild pain.  Qty: 60 tablet, Refills: 0    Associated Diagnoses: Chronic pain of left  knee      !! QUEtiapine (SEROQUEL) 25 MG tablet Take 1 tablet (25 mg) by mouth 2 times daily.  Qty: 60 tablet, Refills: 1    Associated Diagnoses: PTSD (post-traumatic stress disorder); Psychosis, unspecified psychosis type (H)      !! QUEtiapine (SEROQUEL) 50 MG tablet Take 1 tablet (50 mg) by mouth at bedtime.  Qty: 30 tablet, Refills: 1    Associated Diagnoses: Schizoaffective disorder, chronic condition with acute exacerbation (H)       !! - Potential duplicate medications found. Please discuss with provider.        CONTINUE these medications which have NOT CHANGED    Details   albuterol (PROAIR HFA/PROVENTIL HFA/VENTOLIN HFA) 108 (90 Base) MCG/ACT inhaler Inhale 2 puffs into the lungs every 6 hours as needed for shortness of breath, wheezing or cough.      atorvastatin (LIPITOR) 40 MG tablet Take 40 mg by mouth daily.      glipiZIDE (GLUCOTROL) 5 MG tablet Take 5 mg by mouth daily.      ibuprofen (ADVIL/MOTRIN) 800 MG tablet Take 800 mg by mouth every 8 hours as needed for moderate pain.      metFORMIN (GLUCOPHAGE XR) 500 MG 24 hr tablet Take 500 mg by mouth 2 times daily (with meals).      pantoprazole (PROTONIX) 40 MG EC tablet Take 40 mg by mouth daily.           STOP taking these medications       DULoxetine (CYMBALTA) 60 MG capsule Comments:   Reason for Stopping:                    Psychiatric Examination:   Appearance:  awake, alert  Attitude:  uncooperative  Eye Contact:  fair  Mood:  angry and sad   Affect:  constricted mobility  Speech:  clear, coherent  Psychomotor Behavior:  no evidence of tardive dyskinesia, dystonia, or tics and intact station, gait and muscle tone  Thought Process:  linear and goal oriented  Associations:  no loose associations  Thought Content:  no evidence of psychotic thought and passive suicidal ideation present  Insight:  limited  Judgment:  limited  Oriented to:  time, person, and place  Attention Span and Concentration:  fair  Recent and Remote Memory:  fair  Language:  "Able to name objects, Able to repeat phrases, and Able to read and write  Fund of Knowledge: appropriate  Muscle Strength and Tone: normal  Gait and Station: Normal         Discharge Plan:     Health Care Follow-up:   The [facility] Member Services number is [phone], Behavioral Health is option #4.   Use [employer] - [phone] - for transportation to your health care appointments.        [phone] to find housing that you are more comfortable with.        Attend your primary care hospital follow up appointment to establish care and address the physical health problems that were addressed while you where.  Friday, November 15, 2024 @ 1:40PM  Please talk to this doctor about a referral for Collaborative Care psychiatry services so a [facility] psychiatrist can see you.  Nov  15 New ED/Hospital Follow Up with Poppy Wilkinson  Friday Nov 15, 2024 1:40 PM  Please plan to arrive at the clinic at your \"Arrive By\" time for your appointment. Our late policy will be enforced based on this time. [facility]  [address] [place] [address] [phone]            We have contacted the [facility] x2 for psychiatric medication management services. We have been unable to reach the scheduling staff.. Please do a walk in assessment to get mental health services. The address and hours are below.  [facility]  [address] [place] [address]  [facility] Phone: [phone]  Walk in Monday through Friday between 8 and 3:00PM and meet with a  to do an assessment.  Clinic Scheduling: Phone: [phone] (Maryam @ [phone])  The Health Unit Coordinator has faxed these discharge instructions to fax:[phone]       Attestation:  The patient has been seen and evaluated by me,  Luan Farrell MD    Total time " spent was 39 minutes. Over 50% of times was spent counseling and coordination of care regarding coping skills, medication and discharge planning.

## 2024-11-01 ENCOUNTER — PATIENT OUTREACH (OUTPATIENT)
Dept: CARE COORDINATION | Facility: CLINIC | Age: 59
End: 2024-11-01
Payer: COMMERCIAL

## 2024-11-01 NOTE — PROGRESS NOTES
[facility]:   [facility] Contact  [facility]/[employer]     Clinical Data: Post-Discharge Outreach     Outreach attempted x 2.  Left message on patient's voicemail, providing [facility]'s central phone number of [phone] ([phone]) for questions/concerns and/or to schedule an appt with an [facility] provider, if they do not have a PCP.      Plan:  [facility] will do no further outreaches at this time.       GUSTAVO Brody (she/her/hers)  Social Work Clinic Care Coordinator   [facility]  [email or web address]  [phone]      *Connected Care Resource Team does NOT follow patient ongoing. Referrals are identified based on internal discharge reports and the outreach is to ensure patient has an understanding of their discharge instructions.

## 2024-11-15 ENCOUNTER — OFFICE VISIT (OUTPATIENT)
Dept: FAMILY MEDICINE | Facility: CLINIC | Age: 59
End: 2024-11-15
Payer: COMMERCIAL

## 2024-11-15 VITALS
HEIGHT: 69 IN | TEMPERATURE: 97.4 F | BODY MASS INDEX: 35.55 KG/M2 | DIASTOLIC BLOOD PRESSURE: 89 MMHG | SYSTOLIC BLOOD PRESSURE: 129 MMHG | OXYGEN SATURATION: 98 % | HEART RATE: 107 BPM | WEIGHT: 240 LBS | RESPIRATION RATE: 23 BRPM

## 2024-11-15 DIAGNOSIS — M17.12 PRIMARY OSTEOARTHRITIS OF LEFT KNEE: ICD-10-CM

## 2024-11-15 DIAGNOSIS — R45.851 SUICIDAL IDEATION: ICD-10-CM

## 2024-11-15 DIAGNOSIS — Z09 HOSPITAL DISCHARGE FOLLOW-UP: Primary | ICD-10-CM

## 2024-11-15 PROBLEM — J45.20 MILD INTERMITTENT ASTHMA WITHOUT COMPLICATION: Status: ACTIVE | Noted: 2020-04-10

## 2024-11-15 RX ORDER — IBUPROFEN 800 MG/1
800 TABLET, FILM COATED ORAL EVERY 8 HOURS PRN
Qty: 90 TABLET | Refills: 0 | Status: SHIPPED | OUTPATIENT
Start: 2024-11-15

## 2024-11-15 ASSESSMENT — COLUMBIA-SUICIDE SEVERITY RATING SCALE - C-SSRS
1. WITHIN THE PAST MONTH, HAVE YOU WISHED YOU WERE DEAD OR WISHED YOU COULD GO TO SLEEP AND NOT WAKE UP?: YES
5. IN THE PAST MONTH, HAVE YOU STARTED TO WORK OUT OR WORKED OUT THE DETAILS OF HOW TO KILL YOURSELF? DO YOU INTEND TO CARRY OUT THIS PLAN?: NO
6. HAVE YOU EVER DONE ANYTHING, STARTED TO DO ANYTHING, OR PREPARED TO DO ANYTHING TO END YOUR LIFE?: NO
2. IN THE PAST MONTH, HAVE YOU ACTUALLY HAD ANY THOUGHTS OF KILLING YOURSELF?: YES

## 2024-11-15 ASSESSMENT — PATIENT HEALTH QUESTIONNAIRE - PHQ9
SUM OF ALL RESPONSES TO PHQ QUESTIONS 1-9: 18
10. IF YOU CHECKED OFF ANY PROBLEMS, HOW DIFFICULT HAVE THESE PROBLEMS MADE IT FOR YOU TO DO YOUR WORK, TAKE CARE OF THINGS AT HOME, OR GET ALONG WITH OTHER PEOPLE: VERY DIFFICULT
SUM OF ALL RESPONSES TO PHQ QUESTIONS 1-9: 18

## 2024-11-15 ASSESSMENT — PAIN SCALES - GENERAL: PAINLEVEL_OUTOF10: SEVERE PAIN (7)

## 2024-11-15 NOTE — LETTER
Depression Screening Follow Up        11/15/2024    12:56 PM   PHQ   PHQ-9 Total Score 18    Q9: Thoughts of better off dead/self-harm past 2 weeks Nearly every day    F/U: Thoughts of suicide or self-harm Yes    F/U: Self harm-plan Yes    F/U: Self-harm action No    F/U: Safety concerns Yes        Patient-reported     {Last PHQ9 Response (Optional):117705}            11/15/2024     2:13 PM   C-SSRS ([place]rief [place])   Within the last month, have you wished you were dead or wished you could go to sleep and not wake up? Yes   Within the last month, have you had any actual thoughts of killing yourself? Yes   Within the last month, have you started to work out or worked out the details of how to kill yourself with the intent to carry out this plan? No   Within the last month, have you ever done anything, started to do anything, or prepared to do anything to end your life? No       {After C-SRSS completed-Select to see recommended follow up based on results (Optional):155070}  Follow Up Actions Taken  Crisis resource information provided in the After Visit Summary  Referred patient back to mental health provider    Discussed the following ways the patient can remain in a safe environment:   no access to drugs, or guns. Being at [facility] will help staying safe.

## 2024-11-15 NOTE — PROGRESS NOTES
"  Assessment & Plan     Hospital discharge follow-up  Suicidal ideation  Date of Admission:                  10/23/2024  Date of Discharge:                  10/30/2024  Patient with history of suicidal ideation, depression, PTSD, Schizoaffective Disorder, history of cocaine abuse presenting for hospitalization follow up.   He was admitted at [facility] for suicidal ideation.   History of having x2 attempts of suicide when he was 16 and 27 yo.   He is feeling much better now since being admitted to [facility] yesterday.   Continues to have passive SI - denies any intentions to carry out plan. Denies hearing any command voices. Denies HI  Continue all medications as previously prescribed  Needs to follow up with psychiatry - He will get access to psychiatry care at [facility].   Crisis line numbers were provided     Primary osteoarthritis of left knee  Requesting refills of Advil for knee pain. Sent refill.  He follows with orthopedics for this problem.  - ibuprofen (ADVIL/MOTRIN) 800 MG tablet  Dispense: 90 tablet; Refill: 0    He has established care with provider Dr. Yost at [facility]. He wishes to continue his care with Dr. Yost. Recommended to follow up with Dr. Nice in the next 2-4 weeks or sooner as needed.       MED REC REQUIRED  Post Medication Reconciliation Status:  Discharge medications reconciled, continue medications without change    Nicotine/Tobacco Cessation  He reports that he has been smoking. He has never used smokeless tobacco.  Nicotine/Tobacco Cessation Plan  Information offered: Patient not interested at this time      BMI  Estimated body mass index is 35.43 kg/m  as calculated from the following:    Height as of this encounter: 1.753 m (5' 9.02\").    Weight as of this encounter: 108.9 kg (240 lb).       Depression Screening Follow Up        11/15/2024    12:56 PM   PHQ   PHQ-9 Total Score 18    Q9: Thoughts of better off dead/self-harm past 2 weeks Nearly " every day    F/U: Thoughts of suicide or self-harm Yes    F/U: Self harm-plan Yes    F/U: Self-harm action No    F/U: Safety concerns Yes        Patient-reported             11/15/2024     2:13 PM   C-SSRS (Brief [place])   Within the last month, have you wished you were dead or wished you could go to sleep and not wake up? Yes   Within the last month, have you had any actual thoughts of killing yourself? Yes   Within the last month, have you started to work out or worked out the details of how to kill yourself with the intent to carry out this plan? No   Within the last month, have you ever done anything, started to do anything, or prepared to do anything to end your life? No   Has been having thoughts of cutting his wrists - Denies any intention or plan to carry out actions. Feeling much better and safer now since moving to [facility].        Follow Up Actions Taken  Crisis resource information provided in the After Visit Summary  Patient to follow up with PCP.  Clinic staff to schedule appointment if able.  Patient recently moved to [facility]- receiving mental health care at [facility]    Discussed the following ways the patient can remain in a safe environment:  be around others and he has not access to alcohol, drugs, or weapons. He feels a lot better and safer since moving to [facility].   He is able to contact family member to talk        Subjective   Hi is a 59 year old, presenting for the following health issues:  Follow Up (Follow up for a number of EDU/Hosp visits-/) and Hospital F/U (Hospital follow up 10/23-10/30 [facility] suicidal ideation and depression. He also has chronic left knee pain.He was admitted 11/14 to [facility]. He has had a number of EDU visits as well.)        11/15/2024     1:35 PM   Additional Questions   Roomed by Sera SIMMONS     Patient presents for hospital follow up.   Was at [facility] for suicidal ideation from 10/23-10/30  11/14 he was admitted to [facility]  "- [facility]    Will need left knee surgery- but for now managing pain with ibuprofen            Hospital Follow-up Visit:    Hospital/Nursing Home/IP Rehab Facility: [facility]  Date of Admission: 10/23/24  Date of Discharge: 10/30/24  Reason(s) for Admission: suicidal ideation and depression  Was the patient in the ICU or did the patient experience delirium during hospitalization?  No  Do you have any other stressors you would like to discuss with your provider? Health Concerns    Problems taking medications regularly:  None  Medication changes since discharge: None  Problems adhering to non-medication therapy:  None    Summary of hospitalization:  [facility] discharge summary reviewed  Diagnostic Tests/Treatments reviewed.  Follow up needed: with psychiatry  Other Healthcare Providers Involved in Patient s Care:          Needs to follow up with psychiatry - He will get access to psychiatry care at [facility]   Update since discharge: stable.         Plan of care communicated with patient                       Objective    /89 (BP Location: Left arm, Patient Position: Sitting, Cuff Size: Adult Large)   Pulse 107   Temp 97.4  F (36.3  C) (Tympanic)   Resp 23   Ht 1.753 m (5' 9.02\")   Wt 108.9 kg (240 lb)   SpO2 98%   BMI 35.43 kg/m    Body mass index is 35.43 kg/m .  Physical Exam   GENERAL: alert and no distress  NECK: no adenopathy, no asymmetry, masses, or scars  RESP: lungs clear to auscultation - no rales, rhonchi or wheezes  CV: regular rate and rhythm, normal S1 S2, no S3 or S4, no murmur, click or rub, no peripheral edema  ABDOMEN: soft, nontender, no hepatosplenomegaly, no masses and bowel sounds normal  MS: no gross musculoskeletal defects noted, no edema            Signed Electronically by: PAYAL Rogers CNP    "

## 2025-06-02 DIAGNOSIS — M17.12 PRIMARY OSTEOARTHRITIS OF LEFT KNEE: ICD-10-CM

## 2025-06-04 RX ORDER — IBUPROFEN 800 MG/1
800 TABLET, FILM COATED ORAL EVERY 8 HOURS PRN
Qty: 90 TABLET | Refills: 0 | Status: SHIPPED | OUTPATIENT
Start: 2025-06-04